# Patient Record
Sex: MALE | Race: ASIAN | Employment: FULL TIME | URBAN - METROPOLITAN AREA
[De-identification: names, ages, dates, MRNs, and addresses within clinical notes are randomized per-mention and may not be internally consistent; named-entity substitution may affect disease eponyms.]

---

## 2017-02-08 ENCOUNTER — OFFICE VISIT (OUTPATIENT)
Dept: FAMILY MEDICINE CLINIC | Age: 52
End: 2017-02-08

## 2017-02-08 VITALS
BODY MASS INDEX: 24.94 KG/M2 | HEIGHT: 66 IN | HEART RATE: 94 BPM | SYSTOLIC BLOOD PRESSURE: 132 MMHG | OXYGEN SATURATION: 97 % | TEMPERATURE: 98 F | DIASTOLIC BLOOD PRESSURE: 86 MMHG | WEIGHT: 155.2 LBS

## 2017-02-08 DIAGNOSIS — J06.9 VIRAL URI: Primary | ICD-10-CM

## 2017-02-08 PROCEDURE — 99213 OFFICE O/P EST LOW 20 MIN: CPT | Performed by: PHYSICIAN ASSISTANT

## 2017-02-08 ASSESSMENT — ENCOUNTER SYMPTOMS
EYE PAIN: 0
SORE THROAT: 1
WHEEZING: 0
DIARRHEA: 0
VOICE CHANGE: 0
CONSTIPATION: 0
EYE DISCHARGE: 0
SINUS PRESSURE: 0
BACK PAIN: 0
NAUSEA: 0
CHEST TIGHTNESS: 0
APNEA: 0
SHORTNESS OF BREATH: 0
COUGH: 1
RHINORRHEA: 0
VOMITING: 0
ABDOMINAL PAIN: 0
TROUBLE SWALLOWING: 0

## 2018-07-23 ENCOUNTER — OFFICE VISIT (OUTPATIENT)
Dept: FAMILY MEDICINE CLINIC | Age: 53
End: 2018-07-23

## 2018-07-23 VITALS
WEIGHT: 164 LBS | SYSTOLIC BLOOD PRESSURE: 140 MMHG | OXYGEN SATURATION: 95 % | DIASTOLIC BLOOD PRESSURE: 84 MMHG | HEART RATE: 87 BPM | RESPIRATION RATE: 16 BRPM | BODY MASS INDEX: 26.36 KG/M2 | HEIGHT: 66 IN

## 2018-07-23 DIAGNOSIS — Z00.00 WELL ADULT EXAM: Primary | ICD-10-CM

## 2018-07-23 DIAGNOSIS — R03.0 ELEVATED BP WITHOUT DIAGNOSIS OF HYPERTENSION: ICD-10-CM

## 2018-07-23 DIAGNOSIS — Z12.5 PROSTATE CANCER SCREENING: ICD-10-CM

## 2018-07-23 PROCEDURE — 99396 PREV VISIT EST AGE 40-64: CPT | Performed by: FAMILY MEDICINE

## 2018-07-23 PROCEDURE — 93000 ELECTROCARDIOGRAM COMPLETE: CPT | Performed by: FAMILY MEDICINE

## 2018-07-23 ASSESSMENT — PATIENT HEALTH QUESTIONNAIRE - PHQ9
SUM OF ALL RESPONSES TO PHQ9 QUESTIONS 1 & 2: 0
1. LITTLE INTEREST OR PLEASURE IN DOING THINGS: 0
SUM OF ALL RESPONSES TO PHQ QUESTIONS 1-9: 0
2. FEELING DOWN, DEPRESSED OR HOPELESS: 0

## 2018-07-23 NOTE — PROGRESS NOTES
Subjective:      Patient ID: Stvee Jaramillo is a 50 y.o. male. HPI   Here for HPI. No concerns now, but couple months ago, some pain/numbness for about 2-3 weeks, then went away. No trauma, no previous similar sxs. Used to have back pain few years ago, no leg sxs, and they resolved within couple weeks. Energy ok, and sleeping ok. Bloating/gas for years, and no problems to move bowels, but has to sit awhile to get complete relief. Plenty of fiber usually. Diet - relatively lowfat, lots rice, wants to reduce it, some wheat, and grains, few sweets, coffee/tea regular. Little juice, no pop. Exercise - not regularly, but some walking most days, about 30 minutes, and last year tracked, about 8K/day. Since last winter - mouth dries out quickly, seems to be breathing more from mouth than in the past.  Nose gets clogged some in the winter at times. Flu shot through work. [employer] [place] [employer], ok overall. The 10-year ASCVD risk score (Melissa Schmitz, et al., 2013) is: 7.4%    Values used to calculate the score:      Age: 50 years      Sex: Male      Is Non- : No      Diabetic: No      Tobacco smoker: No      Systolic Blood Pressure: 161 mmHg      Is BP treated: No      HDL Cholesterol: 34 mg/dL      Total Cholesterol: 228 mg/dL      Review of Systems   Constitutional: Negative for chills, fatigue and fever. HENT: Negative for ear pain and hearing loss. Eyes: Negative for pain and visual disturbance. Respiratory: Negative for cough and shortness of breath. Cardiovascular: Negative for chest pain and palpitations. Gastrointestinal: Positive for abdominal pain (occasionally with pain, mainly when he is bloated). Negative for diarrhea and vomiting. Genitourinary: Negative for difficulty urinating and dysuria. Musculoskeletal: Negative for arthralgias and gait problem. Neurological: Negative for dizziness, weakness and numbness.    Psychiatric/Behavioral: Negative for dysphoric

## 2018-07-24 ASSESSMENT — ENCOUNTER SYMPTOMS
EYE PAIN: 0
DIARRHEA: 0
VOMITING: 0
COUGH: 0
ABDOMINAL PAIN: 1
SHORTNESS OF BREATH: 0

## 2018-08-02 ENCOUNTER — NURSE ONLY (OUTPATIENT)
Dept: FAMILY MEDICINE CLINIC | Age: 53
End: 2018-08-02

## 2018-08-02 DIAGNOSIS — Z00.00 WELL ADULT EXAM: ICD-10-CM

## 2018-08-02 DIAGNOSIS — Z12.5 PROSTATE CANCER SCREENING: ICD-10-CM

## 2018-08-02 LAB
A/G RATIO: 2 (ref 1.1–2.2)
ALBUMIN SERPL-MCNC: 4.5 G/DL (ref 3.4–5)
ALP BLD-CCNC: 73 U/L (ref 40–129)
ALT SERPL-CCNC: 35 U/L (ref 10–40)
ANION GAP SERPL CALCULATED.3IONS-SCNC: 12 MMOL/L (ref 3–16)
AST SERPL-CCNC: 26 U/L (ref 15–37)
BASOPHILS ABSOLUTE: 0.1 K/UL (ref 0–0.2)
BASOPHILS RELATIVE PERCENT: 1.4 %
BILIRUB SERPL-MCNC: 0.5 MG/DL (ref 0–1)
BILIRUBIN, POC: 0
BLOOD URINE, POC: NORMAL
BUN BLDV-MCNC: 9 MG/DL (ref 7–20)
CALCIUM SERPL-MCNC: 9.4 MG/DL (ref 8.3–10.6)
CHLORIDE BLD-SCNC: 101 MMOL/L (ref 99–110)
CHOLESTEROL, TOTAL: 211 MG/DL (ref 0–199)
CLARITY, POC: CLEAR
CO2: 26 MMOL/L (ref 21–32)
COLOR, POC: NORMAL
CREAT SERPL-MCNC: 1 MG/DL (ref 0.9–1.3)
EOSINOPHILS ABSOLUTE: 0.1 K/UL (ref 0–0.6)
EOSINOPHILS RELATIVE PERCENT: 2.6 %
GFR AFRICAN AMERICAN: >60
GFR NON-AFRICAN AMERICAN: >60
GLOBULIN: 2.3 G/DL
GLUCOSE BLD-MCNC: 94 MG/DL (ref 70–99)
GLUCOSE URINE, POC: 0
HCT VFR BLD CALC: 48.3 % (ref 40.5–52.5)
HDLC SERPL-MCNC: 31 MG/DL (ref 40–60)
HEMOGLOBIN: 15.9 G/DL (ref 13.5–17.5)
KETONES, POC: 0
LDL CHOLESTEROL CALCULATED: 127 MG/DL
LEUKOCYTE EST, POC: 0
LYMPHOCYTES ABSOLUTE: 1.9 K/UL (ref 1–5.1)
LYMPHOCYTES RELATIVE PERCENT: 38.3 %
MCH RBC QN AUTO: 29.1 PG (ref 26–34)
MCHC RBC AUTO-ENTMCNC: 33 G/DL (ref 31–36)
MCV RBC AUTO: 88.2 FL (ref 80–100)
MONOCYTES ABSOLUTE: 0.3 K/UL (ref 0–1.3)
MONOCYTES RELATIVE PERCENT: 5.2 %
NEUTROPHILS ABSOLUTE: 2.6 K/UL (ref 1.7–7.7)
NEUTROPHILS RELATIVE PERCENT: 52.5 %
NITRITE, POC: 0
PDW BLD-RTO: 14.7 % (ref 12.4–15.4)
PH, POC: 6
PLATELET # BLD: 231 K/UL (ref 135–450)
PMV BLD AUTO: 8.3 FL (ref 5–10.5)
POTASSIUM SERPL-SCNC: 4.7 MMOL/L (ref 3.5–5.1)
PROSTATE SPECIFIC ANTIGEN: 0.46 NG/ML (ref 0–4)
PROTEIN, POC: NORMAL
RBC # BLD: 5.48 M/UL (ref 4.2–5.9)
SODIUM BLD-SCNC: 139 MMOL/L (ref 136–145)
SPECIFIC GRAVITY, POC: 1.02
TOTAL PROTEIN: 6.8 G/DL (ref 6.4–8.2)
TRIGL SERPL-MCNC: 265 MG/DL (ref 0–150)
TSH SERPL DL<=0.05 MIU/L-ACNC: 2.3 UIU/ML (ref 0.27–4.2)
UROBILINOGEN, POC: 0.2
VLDLC SERPL CALC-MCNC: 53 MG/DL
WBC # BLD: 4.9 K/UL (ref 4–11)

## 2018-08-02 PROCEDURE — 81002 URINALYSIS NONAUTO W/O SCOPE: CPT | Performed by: FAMILY MEDICINE

## 2018-08-02 PROCEDURE — 36415 COLL VENOUS BLD VENIPUNCTURE: CPT | Performed by: FAMILY MEDICINE

## 2018-08-07 PROBLEM — E78.2 MIXED HYPERLIPIDEMIA: Status: ACTIVE | Noted: 2018-08-07

## 2019-09-24 ENCOUNTER — OFFICE VISIT (OUTPATIENT)
Dept: FAMILY MEDICINE CLINIC | Age: 54
End: 2019-09-24
Payer: COMMERCIAL

## 2019-09-24 VITALS
SYSTOLIC BLOOD PRESSURE: 126 MMHG | WEIGHT: 163.8 LBS | BODY MASS INDEX: 26.33 KG/M2 | DIASTOLIC BLOOD PRESSURE: 80 MMHG | OXYGEN SATURATION: 98 % | HEART RATE: 69 BPM | HEIGHT: 66 IN | TEMPERATURE: 97.8 F

## 2019-09-24 DIAGNOSIS — L30.9 DERMATITIS: Primary | ICD-10-CM

## 2019-09-24 PROCEDURE — 99213 OFFICE O/P EST LOW 20 MIN: CPT | Performed by: FAMILY MEDICINE

## 2019-09-24 RX ORDER — TRIAMCINOLONE ACETONIDE 1 MG/G
CREAM TOPICAL
Qty: 45 G | Refills: 1 | Status: SHIPPED | OUTPATIENT
Start: 2019-09-24 | End: 2020-11-12

## 2019-09-24 ASSESSMENT — PATIENT HEALTH QUESTIONNAIRE - PHQ9
2. FEELING DOWN, DEPRESSED OR HOPELESS: 0
1. LITTLE INTEREST OR PLEASURE IN DOING THINGS: 0
SUM OF ALL RESPONSES TO PHQ QUESTIONS 1-9: 0
SUM OF ALL RESPONSES TO PHQ9 QUESTIONS 1 & 2: 0
SUM OF ALL RESPONSES TO PHQ QUESTIONS 1-9: 0

## 2019-09-25 ASSESSMENT — ENCOUNTER SYMPTOMS
ABDOMINAL PAIN: 0
SHORTNESS OF BREATH: 0

## 2019-09-25 NOTE — PATIENT INSTRUCTIONS
Suspect irritant or allergic dermatitis, with actual cause unknown. Start taking daily generic Allegra 180 mg every night, as well as intermittent use mid potency topical steroid as discussed, for maximum of 14 days in a row.     Call or return if no definite improvement over the next week or so -consider follow-up with allergist.

## 2020-11-12 ENCOUNTER — OFFICE VISIT (OUTPATIENT)
Dept: FAMILY MEDICINE CLINIC | Age: 55
End: 2020-11-12
Payer: COMMERCIAL

## 2020-11-12 VITALS
WEIGHT: 163.8 LBS | TEMPERATURE: 96.7 F | HEIGHT: 66 IN | HEART RATE: 72 BPM | BODY MASS INDEX: 26.33 KG/M2 | SYSTOLIC BLOOD PRESSURE: 155 MMHG | RESPIRATION RATE: 18 BRPM | OXYGEN SATURATION: 98 % | DIASTOLIC BLOOD PRESSURE: 95 MMHG

## 2020-11-12 LAB
A/G RATIO: 1.6 (ref 1.1–2.2)
ALBUMIN SERPL-MCNC: 4.4 G/DL (ref 3.4–5)
ALP BLD-CCNC: 87 U/L (ref 40–129)
ALT SERPL-CCNC: 25 U/L (ref 10–40)
ANION GAP SERPL CALCULATED.3IONS-SCNC: 11 MMOL/L (ref 3–16)
AST SERPL-CCNC: 20 U/L (ref 15–37)
BILIRUB SERPL-MCNC: 0.7 MG/DL (ref 0–1)
BUN BLDV-MCNC: 9 MG/DL (ref 7–20)
CALCIUM SERPL-MCNC: 9.4 MG/DL (ref 8.3–10.6)
CHLORIDE BLD-SCNC: 98 MMOL/L (ref 99–110)
CHOLESTEROL, TOTAL: 246 MG/DL (ref 0–199)
CO2: 26 MMOL/L (ref 21–32)
CREAT SERPL-MCNC: 0.9 MG/DL (ref 0.9–1.3)
GFR AFRICAN AMERICAN: >60
GFR NON-AFRICAN AMERICAN: >60
GLOBULIN: 2.8 G/DL
GLUCOSE BLD-MCNC: 87 MG/DL (ref 70–99)
HDLC SERPL-MCNC: 31 MG/DL (ref 40–60)
LDL CHOLESTEROL CALCULATED: ABNORMAL MG/DL
LDL CHOLESTEROL DIRECT: 166 MG/DL
POTASSIUM SERPL-SCNC: 4.8 MMOL/L (ref 3.5–5.1)
PROSTATE SPECIFIC ANTIGEN: 0.48 NG/ML (ref 0–4)
SODIUM BLD-SCNC: 135 MMOL/L (ref 136–145)
TOTAL PROTEIN: 7.2 G/DL (ref 6.4–8.2)
TRIGL SERPL-MCNC: 324 MG/DL (ref 0–150)
VLDLC SERPL CALC-MCNC: ABNORMAL MG/DL

## 2020-11-12 PROCEDURE — 90686 IIV4 VACC NO PRSV 0.5 ML IM: CPT | Performed by: FAMILY MEDICINE

## 2020-11-12 PROCEDURE — 99396 PREV VISIT EST AGE 40-64: CPT | Performed by: FAMILY MEDICINE

## 2020-11-12 PROCEDURE — 90471 IMMUNIZATION ADMIN: CPT | Performed by: FAMILY MEDICINE

## 2020-11-12 PROCEDURE — 36415 COLL VENOUS BLD VENIPUNCTURE: CPT | Performed by: FAMILY MEDICINE

## 2020-11-12 RX ORDER — LISINOPRIL AND HYDROCHLOROTHIAZIDE 20; 12.5 MG/1; MG/1
1 TABLET ORAL DAILY
Qty: 90 TABLET | Refills: 1 | Status: SHIPPED | OUTPATIENT
Start: 2020-11-12 | End: 2021-02-12 | Stop reason: SDUPTHER

## 2020-11-12 SDOH — SOCIAL STABILITY: SOCIAL NETWORK
DO YOU BELONG TO ANY CLUBS OR ORGANIZATIONS SUCH AS CHURCH GROUPS UNIONS, FRATERNAL OR ATHLETIC GROUPS, OR SCHOOL GROUPS?: NO

## 2020-11-12 SDOH — HEALTH STABILITY: PHYSICAL HEALTH: ON AVERAGE, HOW MANY DAYS PER WEEK DO YOU ENGAGE IN MODERATE TO STRENUOUS EXERCISE (LIKE A BRISK WALK)?: 2 DAYS

## 2020-11-12 SDOH — SOCIAL STABILITY: SOCIAL INSECURITY: WITHIN THE LAST YEAR, HAVE YOU BEEN AFRAID OF YOUR PARTNER OR EX-PARTNER?: NO

## 2020-11-12 SDOH — ECONOMIC STABILITY: TRANSPORTATION INSECURITY
IN THE PAST 12 MONTHS, HAS LACK OF TRANSPORTATION KEPT YOU FROM MEETINGS, WORK, OR FROM GETTING THINGS NEEDED FOR DAILY LIVING?: NO

## 2020-11-12 SDOH — SOCIAL STABILITY: SOCIAL INSECURITY
WITHIN THE LAST YEAR, HAVE YOU BEEN KICKED, HIT, SLAPPED, OR OTHERWISE PHYSICALLY HURT BY YOUR PARTNER OR EX-PARTNER?: NO

## 2020-11-12 SDOH — SOCIAL STABILITY: SOCIAL INSECURITY
WITHIN THE LAST YEAR, HAVE TO BEEN RAPED OR FORCED TO HAVE ANY KIND OF SEXUAL ACTIVITY BY YOUR PARTNER OR EX-PARTNER?: NO

## 2020-11-12 SDOH — ECONOMIC STABILITY: INCOME INSECURITY: HOW HARD IS IT FOR YOU TO PAY FOR THE VERY BASICS LIKE FOOD, HOUSING, MEDICAL CARE, AND HEATING?: NOT HARD AT ALL

## 2020-11-12 SDOH — HEALTH STABILITY: PHYSICAL HEALTH: ON AVERAGE, HOW MANY MINUTES DO YOU ENGAGE IN EXERCISE AT THIS LEVEL?: 40 MIN

## 2020-11-12 SDOH — SOCIAL STABILITY: SOCIAL NETWORK: HOW OFTEN DO YOU ATTENT MEETINGS OF THE CLUB OR ORGANIZATION YOU BELONG TO?: NEVER

## 2020-11-12 SDOH — SOCIAL STABILITY: SOCIAL NETWORK: IN A TYPICAL WEEK, HOW MANY TIMES DO YOU TALK ON THE PHONE WITH FAMILY, FRIENDS, OR NEIGHBORS?: TWICE A WEEK

## 2020-11-12 SDOH — SOCIAL STABILITY: SOCIAL NETWORK: HOW OFTEN DO YOU ATTEND CHURCH OR RELIGIOUS SERVICES?: NEVER

## 2020-11-12 SDOH — ECONOMIC STABILITY: FOOD INSECURITY: WITHIN THE PAST 12 MONTHS, THE FOOD YOU BOUGHT JUST DIDN'T LAST AND YOU DIDN'T HAVE MONEY TO GET MORE.: NEVER TRUE

## 2020-11-12 SDOH — SOCIAL STABILITY: SOCIAL INSECURITY: WITHIN THE LAST YEAR, HAVE YOU BEEN HUMILIATED OR EMOTIONALLY ABUSED IN OTHER WAYS BY YOUR PARTNER OR EX-PARTNER?: NO

## 2020-11-12 SDOH — SOCIAL STABILITY: SOCIAL NETWORK: ARE YOU MARRIED, WIDOWED, DIVORCED, SEPARATED, NEVER MARRIED, OR LIVING WITH A PARTNER?: MARRIED

## 2020-11-12 SDOH — HEALTH STABILITY: MENTAL HEALTH
STRESS IS WHEN SOMEONE FEELS TENSE, NERVOUS, ANXIOUS, OR CAN'T SLEEP AT NIGHT BECAUSE THEIR MIND IS TROUBLED. HOW STRESSED ARE YOU?: TO SOME EXTENT

## 2020-11-12 SDOH — SOCIAL STABILITY: SOCIAL NETWORK: HOW OFTEN DO YOU GET TOGETHER WITH FRIENDS OR RELATIVES?: TWICE A WEEK

## 2020-11-12 SDOH — ECONOMIC STABILITY: FOOD INSECURITY: WITHIN THE PAST 12 MONTHS, YOU WORRIED THAT YOUR FOOD WOULD RUN OUT BEFORE YOU GOT MONEY TO BUY MORE.: NEVER TRUE

## 2020-11-12 SDOH — ECONOMIC STABILITY: TRANSPORTATION INSECURITY
IN THE PAST 12 MONTHS, HAS THE LACK OF TRANSPORTATION KEPT YOU FROM MEDICAL APPOINTMENTS OR FROM GETTING MEDICATIONS?: NO

## 2020-11-12 ASSESSMENT — ANXIETY QUESTIONNAIRES
1. FEELING NERVOUS, ANXIOUS, OR ON EDGE: 1-SEVERAL DAYS
2. NOT BEING ABLE TO STOP OR CONTROL WORRYING: 0-NOT AT ALL
3. WORRYING TOO MUCH ABOUT DIFFERENT THINGS: 0-NOT AT ALL
5. BEING SO RESTLESS THAT IT IS HARD TO SIT STILL: 0-NOT AT ALL
GAD7 TOTAL SCORE: 2
4. TROUBLE RELAXING: 1-SEVERAL DAYS
7. FEELING AFRAID AS IF SOMETHING AWFUL MIGHT HAPPEN: 0-NOT AT ALL
6. BECOMING EASILY ANNOYED OR IRRITABLE: 0-NOT AT ALL

## 2020-11-12 ASSESSMENT — ENCOUNTER SYMPTOMS
DIARRHEA: 0
CONSTIPATION: 0
SHORTNESS OF BREATH: 0
COLOR CHANGE: 0
ABDOMINAL PAIN: 0
EYE DISCHARGE: 0
RHINORRHEA: 0
BACK PAIN: 0
EYE PAIN: 0
BLOOD IN STOOL: 0
SORE THROAT: 0
WHEEZING: 0

## 2020-11-12 ASSESSMENT — PATIENT HEALTH QUESTIONNAIRE - PHQ9
SUM OF ALL RESPONSES TO PHQ QUESTIONS 1-9: 0
SUM OF ALL RESPONSES TO PHQ QUESTIONS 1-9: 0
SUM OF ALL RESPONSES TO PHQ9 QUESTIONS 1 & 2: 0
SUM OF ALL RESPONSES TO PHQ QUESTIONS 1-9: 0
2. FEELING DOWN, DEPRESSED OR HOPELESS: 0
1. LITTLE INTEREST OR PLEASURE IN DOING THINGS: 0

## 2020-11-12 NOTE — PATIENT INSTRUCTIONS
-start lisinopril hctz combo pill in place of your lisinopril  Take 1 tablet daily with water    -low salt diet    -increase physical activity    -set up colonoscopy at your discretion/convenience    -see me back in 3 months    -keep log of your blood pressures each day    -bring your cuff with you to next visit    -okay for dental procedure      -Recommend 150 minutes of cardiovascular activity a week, or 10,000 to 15,000 steps a day, 2 days of weightbearing  -dietary wise, try to stick to your weight x 10 in calories a day  -avoid processed/refined carbohydrates (boxed/canned/frozen/fast)  -encourage healthy protein and fat, butter, avocado, egg      Patient Education        DASH Diet: Care Instructions  Your Care Instructions     The DASH diet is an eating plan that can help lower your blood pressure. DASH stands for Dietary Approaches to Stop Hypertension. Hypertension is high blood pressure. The DASH diet focuses on eating foods that are high in calcium, potassium, and magnesium. These nutrients can lower blood pressure. The foods that are highest in these nutrients are fruits, vegetables, low-fat dairy products, nuts, seeds, and legumes. But taking calcium, potassium, and magnesium supplements instead of eating foods that are high in those nutrients does not have the same effect. The DASH diet also includes whole grains, fish, and poultry. The DASH diet is one of several lifestyle changes your doctor may recommend to lower your high blood pressure. Your doctor may also want you to decrease the amount of sodium in your diet. Lowering sodium while following the DASH diet can lower blood pressure even further than just the DASH diet alone. Follow-up care is a key part of your treatment and safety. Be sure to make and go to all appointments, and call your doctor if you are having problems. It's also a good idea to know your test results and keep a list of the medicines you take.   How can you care for yourself at home? Following the DASH diet  · Eat 4 to 5 servings of fruit each day. A serving is 1 medium-sized piece of fruit, ½ cup chopped or canned fruit, 1/4 cup dried fruit, or 4 ounces (½ cup) of fruit juice. Choose fruit more often than fruit juice. · Eat 4 to 5 servings of vegetables each day. A serving is 1 cup of lettuce or raw leafy vegetables, ½ cup of chopped or cooked vegetables, or 4 ounces (½ cup) of vegetable juice. Choose vegetables more often than vegetable juice. · Get 2 to 3 servings of low-fat and fat-free dairy each day. A serving is 8 ounces of milk, 1 cup of yogurt, or 1 ½ ounces of cheese. · Eat 6 to 8 servings of grains each day. A serving is 1 slice of bread, 1 ounce of dry cereal, or ½ cup of cooked rice, pasta, or cooked cereal. Try to choose whole-grain products as much as possible. · Limit lean meat, poultry, and fish to 2 servings each day. A serving is 3 ounces, about the size of a deck of cards. · Eat 4 to 5 servings of nuts, seeds, and legumes (cooked dried beans, lentils, and split peas) each week. A serving is 1/3 cup of nuts, 2 tablespoons of seeds, or ½ cup of cooked beans or peas. · Limit fats and oils to 2 to 3 servings each day. A serving is 1 teaspoon of vegetable oil or 2 tablespoons of salad dressing. · Limit sweets and added sugars to 5 servings or less a week. A serving is 1 tablespoon jelly or jam, ½ cup sorbet, or 1 cup of lemonade. · Eat less than 2,300 milligrams (mg) of sodium a day. If you limit your sodium to 1,500 mg a day, you can lower your blood pressure even more. Tips for success  · Start small. Do not try to make dramatic changes to your diet all at once. You might feel that you are missing out on your favorite foods and then be more likely to not follow the plan. Make small changes, and stick with them. Once those changes become habit, add a few more changes. · Try some of the following:  ?  Make it a goal to eat a fruit or vegetable at every meal and at snacks. This will make it easy to get the recommended amount of fruits and vegetables each day. ? Try yogurt topped with fruit and nuts for a snack or healthy dessert. ? Add lettuce, tomato, cucumber, and onion to sandwiches. ? Combine a ready-made pizza crust with low-fat mozzarella cheese and lots of vegetable toppings. Try using tomatoes, squash, spinach, broccoli, carrots, cauliflower, and onions. ? Have a variety of cut-up vegetables with a low-fat dip as an appetizer instead of chips and dip. ? Sprinkle sunflower seeds or chopped almonds over salads. Or try adding chopped walnuts or almonds to cooked vegetables. ? Try some vegetarian meals using beans and peas. Add garbanzo or kidney beans to salads. Make burritos and tacos with mashed meneses beans or black beans. Where can you learn more? Go to https://[email or web address]. org and sign in to your [employer] account. Enter L913 in the [facility] box to learn more about \"DASH Diet: Care Instructions. \"     If you do not have an account, please click on the \"Sign Up Now\" link. Current as of: December 16, 2019               Content Version: 12.6  © 9200-3332 [employer]. Care instructions adapted under license by [facility] ([facility]). If you have questions about a medical condition or this instruction, always ask your healthcare professional. [place] [address] any warranty or liability for your use of this information. Patient Education        hydrochlorothiazide and lisinopril  Pronunciation:  LUIS stein and lye SIN oh pril  Brand:  Zestoretic  What is the most important information I should know about hydrochlorothiazide and lisinopril? Do not use if you are pregnant. If you become pregnant, stop taking this medicine and tell your doctor right away.    You should not use this medicine if you have ever had angioedema, if you are unable to urinate, or if you are allergic to sulfa drugs or to any ACE inhibitor. Do not take hydrochlorothiazide and lisinopril within 36 hours before or after taking medicine that contains sacubitril (such as Entresto). If you have diabetes, do not use hydrochlorothiazide and lisinopril together with any medication that contains aliskiren (a blood pressure medicine). What is hydrochlorothiazide and lisinopril? Hydrochlorothiazide is a thiazide diuretic (water pill). Lisinopril is in an ACE inhibitor (ACE stands for angiotensin converting enzyme). Hydrochlorothiazide and lisinopril is a combination medicine used to treat hypertension (high blood pressure). Lowering blood pressure may lower your risk of a stroke or heart attack. Hydrochlorothiazide and lisinopril may also be used for purposes not listed in this medication guide. What should I discuss with my healthcare provider before taking hydrochlorothiazide and lisinopril? You should not use this medicine if you are allergic to hydrochlorothiazide or lisinopril, or if:  · you have hereditary angioedema;  · you are unable to urinate;  · you recently took a heart medicine called sacubitril;  · you have an allergy to sulfa drugs; or  · you have ever had a severe allergic reaction to any ACE inhibitor (benazepril, captopril, enalapril, fosinopril, moexipril, perindopril, quinapril, ramipril, trandolapril). Do not take hydrochlorothiazide and lisinopril within 36 hours before or after taking medicine that contains sacubitril (such as Entresto). If you have diabetes, do not use hydrochlorothiazide and lisinopril together with any medication that contains aliskiren (a blood pressure medicine). You may also need to avoid taking hydrochlorothiazide and lisinopril with aliskiren if you have kidney disease.   Tell your doctor if you have ever had:  · kidney disease (or if you are on dialysis);  · cirrhosis or other liver disease;  · glaucoma;  · heart disease or congestive heart failure;  · asthma or allergies;  · gout;  · lupus;  · an allergy to sulfa drugs or penicillin; or  · if you are on a low-salt diet. Do not use if you are pregnant. Stop using the medicine and tell your doctor right away if you become pregnant. Lisinopril can cause injury or death to the unborn baby if you take the medicine during your second or third trimester. You should not breastfeed while using hydrochlorothiazide and lisinopril. Hydrochlorothiazide and lisinopril is not approved for use by anyone younger than 25years old. How should I take hydrochlorothiazide and lisinopril? Follow all directions on your prescription label and read all medication guides or instruction sheets. Your doctor may occasionally change your dose. Use the medicine exactly as directed. Call your doctor if you have ongoing vomiting or diarrhea, or if you are sweating more than usual. You can easily become dehydrated while taking this medicine. This can lead to very low blood pressure, electrolyte disorders, or kidney failure. Your blood pressure will need to be checked often, and you may need occasional blood tests. If you need surgery, tell your surgeon you currently use this medicine. Keep using this medicine as directed, even if you feel well. High blood pressure often has no symptoms. You may need to use blood pressure medication for the rest of your life. Store at room temperature away from moisture, heat, and light. What happens if I miss a dose? Take the medicine as soon as you can, but skip the missed dose if it is almost time for your next dose. Do not take two doses at one time. What happens if I overdose? Seek emergency medical attention or call the Poison Help line at 1-221.789.4960. What should I avoid while taking hydrochlorothiazide and lisinopril? Drinking alcohol can further lower your blood pressure and may increase certain side effects of this medicine.   Do not use potassium supplements or salt substitutes while you are taking this medicine, unless your doctor has told you to. Avoid getting up too fast from a sitting or lying position, or you may feel dizzy. Avoid becoming overheated or dehydrated during exercise, in hot weather, or by not drinking enough fluids. Follow your doctor's instructions about the type and amount of liquids you should drink. In some cases, drinking too much liquid can be as unsafe as not drinking enough. What are the possible side effects of hydrochlorothiazide and lisinopril? Get emergency medical help if you have signs of an allergic reaction: hives; severe stomach pain; difficulty breathing; swelling of your face, lips, tongue, or throat. Call your doctor at once if you have:  · a light-headed feeling, like you might pass out;  · eye pain, vision problems;  · little or no urination;  · weakness, drowsiness, or feeling restless;  · fever, chills, sore throat, mouth sores, trouble swallowing;  · jaundice (yellowing of the skin or eyes);  · high potassium --nausea, tingly feeling, chest pain, irregular heartbeats, loss of movement;  · low potassium --leg cramps, constipation, irregular heartbeats, fluttering in your chest, increased thirst or urination, numbness or tingling, muscle weakness or limp feeling; or  · low sodium --headache, confusion, slurred speech, severe weakness, vomiting, loss of coordination, feeling unsteady. Common side effects may include:  · cough;  · headache;  · dizziness; or  · tired feeling. This is not a complete list of side effects and others may occur. Call your doctor for medical advice about side effects. You may report side effects to FDA at 6-289-FDA-4512. What other drugs will affect hydrochlorothiazide and lisinopril?   Tell your doctor about all your other medicines, especially:  · any other blood pressure medication;  · lithium;  · everolimus, sirolimus, temsirolimus; or  · NSAIDs (nonsteroidal anti-inflammatory drugs) --aspirin, ibuprofen (Advil, Motrin), naproxen (Aleve), celecoxib, diclofenac, indomethacin, meloxicam, and others. This list is not complete. Other drugs may affect hydrochlorothiazide and lisinopril, including prescription and over-the-counter medicines, vitamins, and herbal products. Not all possible drug interactions are listed here. Where can I get more information? Your pharmacist can provide more information about hydrochlorothiazide and lisinopril. Remember, keep this and all other medicines out of the reach of children, never share your medicines with others, and use this medication only for the indication prescribed. Every effort has been made to ensure that the information provided by [facility] is accurate, up-to-date, and complete, but no guarantee is made to that effect. Drug information contained herein may be time sensitive. [facility] information has been compiled for use by healthcare practitioners and consumers in the [place] and therefore [facility] does not warrant that uses outside of the [place] are appropriate, unless specifically indicated otherwise. [facility]'s drug information does not endorse drugs, diagnose patients or recommend therapy. [facility] drug information is an informational resource designed to assist licensed healthcare practitioners in caring for their patients and/or to serve consumers viewing this service as a supplement to, and not a substitute for, the expertise, skill, knowledge and judgment of healthcare practitioners. The absence of a warning for a given drug or drug combination in no way should be construed to indicate that the drug or drug combination is safe, effective or appropriate for any given patient. [facility] does not assume any responsibility for any aspect of healthcare administered with the aid of information [facility] provides.  The information contained herein is not intended to cover all possible uses, directions, precautions, warnings, drug interactions, allergic reactions, or adverse effects. If you have questions about the drugs you are taking, check with your doctor, nurse or pharmacist.  Copyright 5803-0610 [address]: 15.01. Revision date: 1/6/2020. Care instructions adapted under license by [facility] ([facility]). If you have questions about a medical condition or this instruction, always ask your healthcare professional. [place] [address] any warranty or liability for your use of this information.

## 2020-11-12 NOTE — PROGRESS NOTES
SUBJECTIVE:  Chief Complaint   Patient presents with   [employer] Established New Doctor    Annual Exam    Hypertension    Flu Vaccine    Health Maintenance    Colon Cancer Screening     HPI   Nikita Wilson is a 50 y.o.male that presents today for establish care/annual exam and concerns for HTN/elevated BP:  -HTN/Elevated BP:  Checks at home, runs 163/110 mmHg  Was 180 mmHg when teeth were bothering him  Rx low dose medication Lisinopril 20 mg daily  Vitals:    11/12/20 0922 11/12/20 0959   BP: 138/88 (!) 155/95   Site: Right Upper Arm Right Upper Arm   Position: Sitting Sitting   Cuff Size: Medium Adult Medium Adult   Pulse: 70 72     BP Readings from Last 2 Encounters:   09/24/19 126/80   07/23/18 (!) 140/84     Past Medical History:   Diagnosis Date    HTN (hypertension)     Hyperlipidemia     Tooth infection      History reviewed. No pertinent surgical history. Family History   Problem Relation Age of Onset    Heart Disease Other         2 brothers--CABG     Current Outpatient Medications   Medication Sig Dispense Refill    lisinopril-hydroCHLOROthiazide (PRINZIDE;ZESTORETIC) 20-12.5 MG per tablet Take 1 tablet by mouth daily 90 tablet 1    Omega-3 Fatty Acids (FISH OIL) 1200 MG CAPS Take  by mouth 2 times daily. No current facility-administered medications for this visit.       No Known Allergies    Social History     Socioeconomic History    Marital status:      Spouse name: Eleanor Remy Number of children: 2    Years of education: 23    Highest education level: Master's degree (e.g., MA, MS, Petey, MEd, MSW, KOKI)   Occupational History    Occupation: [employer]   Social Needs    Financial resource strain: Not hard at all   [employer] insecurity     Worry: Never true     Inability: Never true   [employer] needs     Medical: No     Non-medical: No   Tobacco Use    Smoking status: Never Smoker    Smokeless tobacco: Never Used   Substance and Sexual Activity    Alcohol use: No    Drug use: No    Sexual activity: Yes     Partners: Female   Lifestyle    Physical activity     Days per week: 2 days     Minutes per session: 40 min    Stress: To some extent   Relationships    Social connections     Talks on phone: Twice a week     Gets together: Twice a week     Attends Pent[employer]ism service: Never     Active member of club or organization: No     Attends meetings of clubs or organizations: Never     Relationship status:     Intimate partner violence     Fear of current or ex partner: No     Emotionally abused: No     Physically abused: No     Forced sexual activity: No   Other Topics Concern    Not on file   Social History Narrative    -Lives in [place], was in [facility] before    -moved there in 2004, was in [facility] 4 or 5 years prior       Immunization History   Administered Date(s) Administered    Influenza, Quadv, IM, PF (6 mo and older Fluzone, Flulaval, Fluarix, and 3 yrs and older Afluria) 11/12/2020    Td, unspecified formulation 01/23/2006    Tdap (Boostrix, Adacel) 01/07/2016     Past medical, surgical, and social history reviewed and updated. Medications, immunizations, and allergies reviewed and updated     Review of Systems   Constitutional: Negative for activity change, appetite change, chills, fever and unexpected weight change. HENT: Positive for dental problem. Negative for congestion, rhinorrhea and sore throat. Eyes: Negative for pain, discharge and visual disturbance. Respiratory: Negative for shortness of breath and wheezing. Cardiovascular: Negative for chest pain and leg swelling. Gastrointestinal: Negative for abdominal pain, blood in stool, constipation and diarrhea. Endocrine: Negative for polyuria. Genitourinary: Negative for dysuria and flank pain. Musculoskeletal: Positive for arthralgias (pain in knees, hiking). Negative for back pain and neck pain. Skin: Negative for color change, rash (improved) and wound. to person, place, and time. Cranial Nerves: No cranial nerve deficit. Psychiatric:         Speech: Speech normal.         Thought Content: Thought content does not include homicidal or suicidal ideation. ASSESSMENT/PLAN:  Shannan Jiang is a 61-year-old male who presents today for his annual physical exam.  Happens to have high blood pressure at the dental office and has been prescribed lisinopril 20 mg recently by urgent care physician. Blood pressure still elevated today and at home. Patient will bring his blood pressure cuff to calibrate at next office visit. DASH diet, cardiovascular to [place], add HCTZ to her regimen. Ambulatory monitoring. Follow-up in 3 months. Diabetes and cholesterol screening today, referred for colonoscopy. Patient with some gaseous pain as well and we will readdress at next visit. History and physical otherwise unremarkable. Consider shingles shot going forward, flu shot today. 1. Annual physical exam  -Chart/records reviewed, history and physical performed, health maintenance addressed and updated, presenting problems addressed. 2. Benign essential HTN  -not at goal; add hctz to lisinopril 20 mg daily  -start lisinopril hctz combo pill in place of your lisinopril  Take 1 tablet daily with water  -low salt diet  -increase physical activity  - Comprehensive Metabolic Panel  - lisinopril-hydroCHLOROthiazide (PRINZIDE;ZESTORETIC) 20-12.5 MG per tablet; Take 1 tablet by mouth daily  Dispense: 90 tablet; Refill: 1    3.  Mixed hyperlipidemia  Lab Results   Component Value Date    CHOL 211 (H) 08/02/2018    CHOL 228 (H) 01/07/2016    CHOL 226 (H) 10/01/2011     Lab Results   Component Value Date    TRIG 265 (H) 08/02/2018    TRIG 282 (H) 01/07/2016    TRIG 250 (H) 10/01/2011     Lab Results   Component Value Date    HDL 31 (L) 08/02/2018    HDL 34 (L) 01/07/2016    HDL 35 (L) 10/01/2011     Lab Results   Component Value Date    LDLCALC 127 (H) 08/02/2018    [facility] 138 (H) 01/07/2016    LDLCALC 141 (H) 10/01/2011     Lab Results   Component Value Date    LABVLDL 53 08/02/2018    LABVLDL 56 01/07/2016    LABVLDL 50 10/01/2011     No results found for: CHOLHDLRATIO  The 10-year ASCVD risk score (Vitaliy Simpson, et al., 2013) is: 12.5%    Values used to calculate the score:      Age: 50 years      Sex: Male      Is Non- : No      Diabetic: No      Tobacco smoker: No      Systolic Blood Pressure: 455 mmHg      Is BP treated: Yes      HDL Cholesterol: 31 mg/dL      Total Cholesterol: 211 mg/dL    - Lipid Panel    4. Tooth infection  -continue abx, okay for dental procedure based on BP    5. Screening PSA (prostate specific antigen)  - PSA, Prostatic Specific Antigen    6. Diabetes mellitus screening  - Comprehensive Metabolic Panel  - Hemoglobin A1C    7. Colon cancer screening  -set up colonoscopy at your discretion/convenience  - Oralee Saint, MD, Gastroenterology, [facility]    8. Need for influenza vaccination  - INFLUENZA, QUADV, 3 YRS AND OLDER, IM PF, PREFILL SYR OR SDV, 0.5ML (AFLURIA QUADV, PF)    Reviewed treatment plan with patient. Patient verbalized understanding to treatment plan and questions were answered. Return in about 3 months (around 2/12/2021). Anila Burden.  Christie Monzon.      11/12/2020

## 2020-11-12 NOTE — PROGRESS NOTES
Vaccine Information Sheet, \"Influenza - Inactivated\"  given to Melvi Hernández, or parent/legal guardian of  Melvi Hernández and verbalized understanding. Patient responses:    Have you ever had a reaction to a flu vaccine? No  Do you have any current illness? No  Have you ever had Guillian Gray Syndrome? No  Do you have a serious allergy to any of the follow: Neomycin, Polymyxin, Thimerosal, eggs or egg products? No    Flu vaccine given per order. Please see immunization tab. Risks and benefits explained. Current VIS given.

## 2020-11-13 LAB
ESTIMATED AVERAGE GLUCOSE: 108.3 MG/DL
HBA1C MFR BLD: 5.4 %

## 2020-12-17 ENCOUNTER — TELEPHONE (OUTPATIENT)
Dept: FAMILY MEDICINE CLINIC | Age: 55
End: 2020-12-17

## 2020-12-17 NOTE — TELEPHONE ENCOUNTER
PROVIDER FEEDBACK LOOP CALLED 3X     Patient:Eamon Albarran  : 1965  Referring Provider: Jose Gonsalez. Referral Type:  Eval and Treat     Procedures: AMB REFERRAL TO GASTROENTEROLOGY [REF25]  Date Service Ordered 2020        We were unable to reach Amari Davis to schedule test ordered by your office after 3 call attempts. Please call your patient to explain significance of ordered test and direct patient to call Central Scheduling to re-schedule test at their earliest convenience.     Please complete one of the following actions from Quick Actions buttons:     Route to Provider:  Route message to ordering provider to seek next steps in care plan.     Telephone Encounter:  Telephone encounter will open. Call patient to explain significance of ordered test and direct patient to call Central Scheduling to schedule test then Document details of call.     Open Referral:  Review referral notes or details if needed.     Close Referral:  Referral will open. Document in Notes section of referral why the referral is being closed. Examples of referral closure:  Patient had test done outside of [facility] ([facility]) (list location), Patient refuses test, Patient no longer having symptoms, Unable to reach patient.   Only close the referral if you are sure the test will not proceed.     Thank you     PARI Gary.

## 2021-02-12 ENCOUNTER — OFFICE VISIT (OUTPATIENT)
Dept: PRIMARY CARE CLINIC | Age: 56
End: 2021-02-12
Payer: COMMERCIAL

## 2021-02-12 ENCOUNTER — HOSPITAL ENCOUNTER (OUTPATIENT)
Age: 56
Discharge: HOME | End: 2021-02-12
Payer: COMMERCIAL

## 2021-02-12 VITALS
WEIGHT: 166.6 LBS | HEART RATE: 68 BPM | DIASTOLIC BLOOD PRESSURE: 92 MMHG | HEIGHT: 65 IN | SYSTOLIC BLOOD PRESSURE: 148 MMHG | TEMPERATURE: 98.4 F | OXYGEN SATURATION: 97 % | BODY MASS INDEX: 27.76 KG/M2

## 2021-02-12 DIAGNOSIS — Z91.89 RISK OF MYOCARDIAL INFARCTION OR STROKE 7.5% OR GREATER IN NEXT 10 YEARS: ICD-10-CM

## 2021-02-12 DIAGNOSIS — I10 ESSENTIAL HYPERTENSION: Primary | ICD-10-CM

## 2021-02-12 DIAGNOSIS — I10 ESSENTIAL HYPERTENSION: ICD-10-CM

## 2021-02-12 DIAGNOSIS — Z12.11 COLON CANCER SCREENING: ICD-10-CM

## 2021-02-12 DIAGNOSIS — E78.2 MIXED HYPERLIPIDEMIA: ICD-10-CM

## 2021-02-12 LAB
ANION GAP SERPL CALCULATED.3IONS-SCNC: 9 MMOL/L (ref 3–16)
BUN BLDV-MCNC: 9 MG/DL (ref 7–20)
CALCIUM SERPL-MCNC: 9.8 MG/DL (ref 8.3–10.6)
CHLORIDE BLD-SCNC: 99 MMOL/L (ref 99–110)
CO2: 29 MMOL/L (ref 21–32)
CREAT SERPL-MCNC: 0.9 MG/DL (ref 0.9–1.3)
GFR AFRICAN AMERICAN: >60
GFR NON-AFRICAN AMERICAN: >60
GLUCOSE BLD-MCNC: 91 MG/DL (ref 70–99)
POTASSIUM SERPL-SCNC: 4.2 MMOL/L (ref 3.5–5.1)
SODIUM BLD-SCNC: 137 MMOL/L (ref 136–145)

## 2021-02-12 PROCEDURE — 99213 OFFICE O/P EST LOW 20 MIN: CPT | Performed by: FAMILY MEDICINE

## 2021-02-12 PROCEDURE — 80048 BASIC METABOLIC PNL TOTAL CA: CPT

## 2021-02-12 PROCEDURE — 36415 COLL VENOUS BLD VENIPUNCTURE: CPT

## 2021-02-12 RX ORDER — ATORVASTATIN CALCIUM 20 MG/1
20 TABLET, FILM COATED ORAL DAILY
Qty: 90 TABLET | Refills: 1 | Status: SHIPPED | OUTPATIENT
Start: 2021-02-12 | End: 2021-08-17 | Stop reason: SDUPTHER

## 2021-02-12 RX ORDER — LISINOPRIL AND HYDROCHLOROTHIAZIDE 20; 12.5 MG/1; MG/1
2 TABLET ORAL DAILY
Qty: 180 TABLET | Refills: 1 | Status: SHIPPED | OUTPATIENT
Start: 2021-02-12 | End: 2021-08-17 | Stop reason: SDUPTHER

## 2021-02-12 SDOH — HEALTH STABILITY: PHYSICAL HEALTH: ON AVERAGE, HOW MANY MINUTES DO YOU ENGAGE IN EXERCISE AT THIS LEVEL?: NOT ASKED

## 2021-02-12 ASSESSMENT — ENCOUNTER SYMPTOMS
SHORTNESS OF BREATH: 0
CONSTIPATION: 0
DIARRHEA: 0
ABDOMINAL PAIN: 0
BLOOD IN STOOL: 0

## 2021-02-12 ASSESSMENT — PATIENT HEALTH QUESTIONNAIRE - PHQ9
SUM OF ALL RESPONSES TO PHQ QUESTIONS 1-9: 0

## 2021-02-12 NOTE — PATIENT INSTRUCTIONS
Increase losartan- hctz to 2 pills a day    Start atorvastatin for cholesterol and to lower stroke/heart attack risk    Call to set up colonoscopy    -Recommend 150 minutes of cardiovascular activity a week, or 10,000 to 15,000 steps a day, 2 days of weightbearing  -dietary wise, try to stick to your weight x 10 in calories a day  -avoid processed/refined carbohydrates (boxed/canned/frozen/fast)  -encourage healthy protein and fat, butter, avocado, egg    Low salt diet    Avoid/limit alcohol    Patient Education        DASH Diet: Care Instructions  Your Care Instructions     The DASH diet is an eating plan that can help lower your blood pressure. DASH stands for Dietary Approaches to Stop Hypertension. Hypertension is high blood pressure. The DASH diet focuses on eating foods that are high in calcium, potassium, and magnesium. These nutrients can lower blood pressure. The foods that are highest in these nutrients are fruits, vegetables, low-fat dairy products, nuts, seeds, and legumes. But taking calcium, potassium, and magnesium supplements instead of eating foods that are high in those nutrients does not have the same effect. The DASH diet also includes whole grains, fish, and poultry. The DASH diet is one of several lifestyle changes your doctor may recommend to lower your high blood pressure. Your doctor may also want you to decrease the amount of sodium in your diet. Lowering sodium while following the DASH diet can lower blood pressure even further than just the DASH diet alone. Follow-up care is a key part of your treatment and safety. Be sure to make and go to all appointments, and call your doctor if you are having problems. It's also a good idea to know your test results and keep a list of the medicines you take. How can you care for yourself at home?   Following the DASH diet · Eat 4 to 5 servings of fruit each day. A serving is 1 medium-sized piece of fruit, ½ cup chopped or canned fruit, 1/4 cup dried fruit, or 4 ounces (½ cup) of fruit juice. Choose fruit more often than fruit juice. · Eat 4 to 5 servings of vegetables each day. A serving is 1 cup of lettuce or raw leafy vegetables, ½ cup of chopped or cooked vegetables, or 4 ounces (½ cup) of vegetable juice. Choose vegetables more often than vegetable juice. · Get 2 to 3 servings of low-fat and fat-free dairy each day. A serving is 8 ounces of milk, 1 cup of yogurt, or 1 ½ ounces of cheese. · Eat 6 to 8 servings of grains each day. A serving is 1 slice of bread, 1 ounce of dry cereal, or ½ cup of cooked rice, pasta, or cooked cereal. Try to choose whole-grain products as much as possible. · Limit lean meat, poultry, and fish to 2 servings each day. A serving is 3 ounces, about the size of a deck of cards. · Eat 4 to 5 servings of nuts, seeds, and legumes (cooked dried beans, lentils, and split peas) each week. A serving is 1/3 cup of nuts, 2 tablespoons of seeds, or ½ cup of cooked beans or peas. · Limit fats and oils to 2 to 3 servings each day. A serving is 1 teaspoon of vegetable oil or 2 tablespoons of salad dressing. · Limit sweets and added sugars to 5 servings or less a week. A serving is 1 tablespoon jelly or jam, ½ cup sorbet, or 1 cup of lemonade. · Eat less than 2,300 milligrams (mg) of sodium a day. If you limit your sodium to 1,500 mg a day, you can lower your blood pressure even more. Tips for success  · Start small. Do not try to make dramatic changes to your diet all at once. You might feel that you are missing out on your favorite foods and then be more likely to not follow the plan. Make small changes, and stick with them. Once those changes become habit, add a few more changes.   · Try some of the following: ? Make it a goal to eat a fruit or vegetable at every meal and at snacks. This will make it easy to get the recommended amount of fruits and vegetables each day. ? Try yogurt topped with fruit and nuts for a snack or healthy dessert. ? Add lettuce, tomato, cucumber, and onion to sandwiches. ? Combine a ready-made pizza crust with low-fat mozzarella cheese and lots of vegetable toppings. Try using tomatoes, squash, spinach, broccoli, carrots, cauliflower, and onions. ? Have a variety of cut-up vegetables with a low-fat dip as an appetizer instead of chips and dip. ? Sprinkle sunflower seeds or chopped almonds over salads. Or try adding chopped walnuts or almonds to cooked vegetables. ? Try some vegetarian meals using beans and peas. Add garbanzo or kidney beans to salads. Make burritos and tacos with mashed meneses beans or black beans. Where can you learn more? Go to https://[employer]. org and sign in to your [employer] account. Enter H171 in the I[employer] box to learn more about \"DASH Diet: Care Instructions. \"     If you do not have an account, please click on the \"Sign Up Now\" link. Current as of: December 16, 2019               Content Version: 12.6  © 5009-8939 [employer] Care instructions adapted under license by [facility] ([facility]). If you have questions about a medical condition or this instruction, always ask your healthcare professional. [place] [address] any warranty or liability for your use of this information. Patient Education        atorvastatin  Pronunciation:  a TOR va sta tin  Brand:  Lipitor  What is the most important information I should know about atorvastatin? You should not take atorvastatin if you are pregnant or breast-feeding, or if you have liver disease. Stop taking this medication and tell your doctor right away if you become pregnant. Tell your doctor about all your current medicines and any you start or stop using. Many drugs can interact, and some drugs should not be used together. Atorvastatin can cause the breakdown of muscle tissue, which can lead to kidney failure. Call your doctor right away if you have unexplained muscle pain, tenderness, or weakness especially if you also have fever, unusual tiredness, or dark urine. What is atorvastatin? Atorvastatin is used together with diet to lower blood levels of \"bad\" cholesterol (low-density lipoprotein, or LDL), to increase levels of \"good\" cholesterol (high-density lipoprotein, or HDL), and to lower triglycerides (a type of fat in the blood). Atorvastatin is used to treat high cholesterol, and to lower the risk of stroke, heart attack, or other heart complications in people with type 2 diabetes, coronary heart disease, or other risk factors. Atorvastatin is used in adults and children who are at least 8years old. Atorvastatin may also be used for purposes not listed in this medication guide. What should I discuss with my healthcare provider before taking atorvastatin? You should not use atorvastatin if you are allergic to it, or if you have:  · liver disease; or  · if you are pregnant or breast-feeding. This medicine can harm an unborn baby or cause birth defects. Do not use if you are pregnant. Stop taking atorvastatin and tell your doctor right away if you become pregnant Use effective birth control to prevent pregnancy while you are taking this medicine. Do not  breast-feed while you are taking atorvastatin. Tell your doctor if you have ever had:  · liver problems;  · muscle pain or weakness;  · kidney disease;  · diabetes;  · a thyroid disorder; or  · if you drink more than 2 alcoholic beverages daily. Atorvastatin can cause the breakdown of muscle tissue, which can lead to kidney failure. This happens more often in women, in older adults, or people who have kidney disease or poorly controlled hypothyroidism (underactive thyroid). Atorvastatin is not approved for use by anyone younger than 8years old. How should I take atorvastatin? Follow all directions on your prescription label and read all medication guides or instruction sheets. Your doctor may occasionally change your dose. Use the medicine exactly as directed. Take the medicine at the same time each day, with or without food. Do not break an atorvastatin tablet before taking it. You may need to stop using atorvastatin for a short time if you have:  · uncontrolled seizures;  · an electrolyte imbalance (such as high or low potassium levels in your blood);  · severely low blood pressure;  · a severe infection or illness; or  · surgery or a medical emergency. It may take up to 2 weeks before your cholesterol levels improve, and you may need frequent blood tests. Even if you have no symptoms, tests can help your doctor determine if this medicine is effective. Atorvastatin is only part of a complete treatment program that may also include diet, exercise, and weight control. Follow your doctor's instructions very closely. Store at room temperature away from moisture, heat, and light. What happens if I miss a dose? Use the medicine as soon as you can, but skip the missed dose if you are more than 12 hours late for the dose. Do not use two doses at one time. What happens if I overdose? Seek emergency medical attention or call the Poison Help line at 1-481.288.6361. What should I avoid while taking atorvastatin? Avoid eating foods high in fat or cholesterol, or atorvastatin will not be as effective. Avoid drinking alcohol. It can raise triglyceride levels and may increase your risk of liver damage. Grapefruit may interact with atorvastatin and lead to unwanted side effects. Avoid drinking more than 1 liter of grapefruit juice while taking atorvastatin. What are the possible side effects of atorvastatin? Get emergency medical help if you have signs of an allergic reaction: hives; difficulty breathing; swelling of your face, lips, tongue, or throat. In rare cases, atorvastatin can cause a condition that results in the breakdown of skeletal muscle tissue, leading to kidney failure. Call your doctor right away if you have unexplained muscle pain, tenderness, or weakness especially if you also have fever, unusual tiredness, and dark colored urine. Also call your doctor at once if you have:  · pain or burning when you urinate;  · liver problems --upper stomach pain, weakness, tired feeling, loss of appetite, dark urine, jaundice (yellowing of the skin or eyes); or  · kidney problems --little or no urinating, swelling in your feet or ankles, feeling tired or short of breath. Common side effects may include:  · joint pain;  · stuffy nose, sore throat;  · diarrhea; or  · pain in your arms or legs. This is not a complete list of side effects and others may occur. Call your doctor for medical advice about side effects. You may report side effects to FDA at 1-284-FDA-6686. What other drugs will affect atorvastatin? Certain other drugs can increase your risk of serious muscle problems, and it is very important that your doctor knows if you are using any of them. Tell your doctor about all your current medicines and any you start or stop using, especially:  · antibiotic or antifungal medicine;  · birth control pills;  · other cholesterol-lowering medication;  · heart medication; or  · medicine to treat HIV or AIDS. This list is not complete. Other drugs may affect atorvastatin, including prescription and over-the-counter medicines, vitamins, and herbal products. Not all possible drug interactions are listed here. Where can I get more information? Your pharmacist can provide more information about atorvastatin. Remember, keep this and all other medicines out of the reach of children, never share your medicines with others, and use this medication only for the indication prescribed. Every effort has been made to ensure that the information provided by [address] is accurate, up-to-date, and complete, but no guarantee is made to that effect. Drug information contained herein may be time sensitive. [facility] information has been compiled for use by healthcare practitioners and consumers in the [facility] and therefore [facility] does not warrant that uses outside of the [facility] are appropriate, unless specifically indicated otherwise. [facility]'s drug information does not endorse drugs, diagnose patients or recommend therapy. [facility]'s drug information is an informational resource designed to assist licensed healthcare practitioners in caring for their patients and/or to serve consumers viewing this service as a supplement to, and not a substitute for, the expertise, skill, knowledge and judgment of healthcare practitioners. The absence of a warning for a given drug or drug combination in no way should be construed to indicate that the drug or drug combination is safe, effective or appropriate for any given patient. [facility] does not assume any responsibility for any aspect of healthcare administered with the aid of information [facility] provides. The information contained herein is not intended to cover all possible uses, directions, precautions, warnings, drug interactions, allergic reactions, or adverse effects. If you have questions about the drugs you are taking, check with your doctor, nurse or pharmacist.  Copyright 6157-9674 [address]: .02. Revision date: 9/27/2019.

## 2021-02-12 NOTE — PROGRESS NOTES
Chief Complaint   Patient presents with    3 Month Follow-Up    Hypertension    Hyperlipidemia     ascvd risk score 17.3%    Colon Cancer Screening     HPI:  Edmundo Power is a 51 y/o male here today for blood pressure follow up, ASCVD risk score >10%, and for reminder about colon cancer screening:    HTN:  Still high at home on Omiron cuff, brought into office today and higher than our automated and manual cuffs  140-150/80's/90's  No medication side effects  Denies headaches or chest pain    BP Readings from Last 3 Encounters:   02/12/21 (!) 148/92   11/12/20 (!) 155/95   09/24/19 126/80     Vitals:    02/12/21 0937 02/12/21 0953 02/12/21 0954 02/12/21 1009   BP: 138/86 (!) 161/105 (!) 169/108 (!) 148/92   Site:  Left Lower Arm  Left Upper Arm   Position:  Sitting  Sitting   Cuff Size:  Medium Adult  Medium Adult   Pulse: 76 73  68   Temp: 98.4 °F (36.9 °C)      TempSrc: Temporal      SpO2: 97%      Weight: 166 lb 9.6 oz (75.6 kg)      Height: 5' 5\" (1.651 m)        Hyperlipidemia:  The 10-year ASCVD risk score (Brittani Barcenas, et al., 2013) is: 17.3%    Lab Results   Component Value Date    CHOL 246 (H) 11/12/2020    CHOL 211 (H) 08/02/2018    CHOL 228 (H) 01/07/2016     Lab Results   Component Value Date    TRIG 324 (H) 11/12/2020    TRIG 265 (H) 08/02/2018    TRIG 282 (H) 01/07/2016     Lab Results   Component Value Date    HDL 31 (L) 11/12/2020    HDL 31 (L) 08/02/2018    HDL 34 (L) 01/07/2016     Lab Results   Component Value Date    LDLCALC see below 11/12/2020    LDLCALC 127 (H) 08/02/2018    LDLCALC 138 (H) 01/07/2016     Lab Results   Component Value Date    LABVLDL see below 11/12/2020    LABVLDL 53 08/02/2018    LABVLDL 56 01/07/2016     No results found for: CHOLHDLRATIO      Colon cancer screening:  Has not yet set up; will print out referal again today    Declines shingles vaccine, hepatitis c and hiv screening      Past Medical History:   Diagnosis Date    HTN (hypertension)  Hyperlipidemia     Risk of myocardial infarction or stroke 7.5% or greater in next 10 years 2/12/2021    Tooth infection      History reviewed. No pertinent surgical history. Current Outpatient Medications   Medication Sig Dispense Refill    lisinopril-hydroCHLOROthiazide (PRINZIDE;ZESTORETIC) 20-12.5 MG per tablet Take 2 tablets by mouth daily 180 tablet 1    atorvastatin (LIPITOR) 20 MG tablet Take 1 tablet by mouth daily 90 tablet 1    Omega-3 Fatty Acids (FISH OIL) 1200 MG CAPS Take  by mouth 2 times daily. No current facility-administered medications for this visit. No Known Allergies    Family History   Problem Relation Age of Onset    Heart Disease Other         2 brothers--CABG     Social History     Socioeconomic History    Marital status:      Spouse name: Liz Araiza    Number of children: 2    Years of education: 23    Highest education level: Master's degree (e.g., MA, MS, Petey, MEd, MSW, KOKI)   Occupational History    Occupation: [employer]   Social Needs    Financial resource strain: Not hard at all   [place] insecurity     Worry: Never true     Inability: Never true    Transportation needs     Medical: No     Non-medical: No   Tobacco Use    Smoking status: Never Smoker    Smokeless tobacco: Never Used   Substance and Sexual Activity    Alcohol use: No    Drug use: No    Sexual activity: Yes     Partners: Female   Lifestyle    Physical activity     Days per week: 0 days     Minutes per session: Not on file    Stress: To some extent   Relationships    Social connections     Talks on phone: Twice a week     Gets together: Twice a week     Attends Congregational service: Never     Active member of club or organization: No     Attends meetings of clubs or organizations: Never     Relationship status:     Intimate partner violence     Fear of current or ex partner: No     Emotionally abused: No     Physically abused:  No Forced sexual activity: No   Other Topics Concern    Not on file   Social History Narrative    -Lives in [place], was in [facility] before    -moved there in 2004, was in [facility] 4 or 5 years prior         Review of Systems   Constitutional: Negative for chills, fever and unexpected weight change. HENT: Negative for dental problem. Eyes: Negative for visual disturbance. Respiratory: Negative for shortness of breath. Cardiovascular: Negative for chest pain. Gastrointestinal: Negative for abdominal pain, blood in stool, constipation and diarrhea. Endocrine: Negative for polyuria. Genitourinary: Negative for dysuria and hematuria. Musculoskeletal: Positive for arthralgias (knee pain when hikes). Negative for joint swelling. Skin: Negative for rash. Allergic/Immunologic: Negative for environmental allergies and food allergies. Neurological: Negative for weakness, numbness and headaches. Psychiatric/Behavioral: Negative for dysphoric mood and sleep disturbance. The patient is not nervous/anxious. Vitals:    02/12/21 0937 02/12/21 0953 02/12/21 0954 02/12/21 1009   BP: 138/86 (!) 161/105 (!) 169/108 (!) 148/92   Site:  Left Lower Arm  Left Upper Arm   Position:  Sitting  Sitting   Cuff Size:  Medium Adult  Medium Adult   Pulse: 76 73  68   Temp: 98.4 °F (36.9 °C)      TempSrc: Temporal      SpO2: 97%      Weight: 166 lb 9.6 oz (75.6 kg)      Height: 5' 5\" (1.651 m)        Physical Exam  Constitutional:       Appearance: Normal appearance. He is not ill-appearing. HENT:      Head: Normocephalic and atraumatic. Eyes:      Extraocular Movements: Extraocular movements intact. Cardiovascular:      Rate and Rhythm: Normal rate and regular rhythm. Heart sounds: Normal heart sounds. No murmur. No friction rub. Pulmonary:      Effort: Pulmonary effort is normal.      Breath sounds: Normal breath sounds. No decreased breath sounds, wheezing, rhonchi or rales.    Neurological: General: No focal deficit present. Mental Status: He is alert and oriented to person, place, and time. Mental status is at baseline. Psychiatric:         Mood and Affect: Mood normal.         Behavior: Behavior normal.         Thought Content: Thought content normal.         Assessment and Plan:  Ashvin Healy is a 56-year-old male who presents for 3-month follow-up of uncontrolled blood pressure. Was started on lisinopril HCTZ 2012.5 milligrams daily at last office visit approximately 3 months ago. Blood pressure still greater than 140/90 in the ambulatory setting and here today in the office. Will increase patient dose to 40-25 mg daily. Encourage DASH diet, increase cardiovascular activity, recommend low refined carbohydrate diet. Patient with ASCVD risk of 17.3% and explained benefit of statin medication to help prevent stroke and heart attack. Patient agreeable to try mild to moderate dose statin. Patient was referred for colonoscopy last office visit but has not made a scheduled appointment, and I printed out his referral again and encouraged him to do so. Continue blood pressure log at home and report in the next 6 weeks through [address] Follow-up in 3 months. Monitor kidney and electrolytes today through blood work. Patient questions answered and verbalized understanding treatment plan and agreeable to plan. 1. Essential hypertension  - lisinopril-hydroCHLOROthiazide (PRINZIDE;ZESTORETIC) 20-12.5 MG per tablet; Take 2 tablets by mouth daily  Dispense: 180 tablet; Refill: 1  - Basic Metabolic Panel; Future    2. Mixed hyperlipidemia  3. Risk of myocardial infarction or stroke 7.5% or greater in next 10 years  ASCVD risk 17.3%  - atorvastatin (LIPITOR) 20 MG tablet; Take 1 tablet by mouth daily  Dispense: 90 tablet; Refill: 1    4.  Colon cancer screening  -given referal again today Return in about 3 months (around 5/12/2021) for blood pressure check, blood work, get colonoscopy. Tayo Lynch.  Prasanth Franco.  2/12/2021

## 2021-08-17 ENCOUNTER — OFFICE VISIT (OUTPATIENT)
Dept: PRIMARY CARE CLINIC | Age: 56
End: 2021-08-17
Payer: COMMERCIAL

## 2021-08-17 VITALS
TEMPERATURE: 98.2 F | HEART RATE: 70 BPM | SYSTOLIC BLOOD PRESSURE: 130 MMHG | WEIGHT: 162.2 LBS | BODY MASS INDEX: 26.99 KG/M2 | OXYGEN SATURATION: 97 % | DIASTOLIC BLOOD PRESSURE: 86 MMHG

## 2021-08-17 DIAGNOSIS — Z23 NEED FOR SHINGLES VACCINE: ICD-10-CM

## 2021-08-17 DIAGNOSIS — E66.3 OVERWEIGHT (BMI 25.0-29.9): ICD-10-CM

## 2021-08-17 DIAGNOSIS — Z91.89 RISK OF MYOCARDIAL INFARCTION OR STROKE 7.5% OR GREATER IN NEXT 10 YEARS: ICD-10-CM

## 2021-08-17 DIAGNOSIS — E78.2 MIXED HYPERLIPIDEMIA: ICD-10-CM

## 2021-08-17 DIAGNOSIS — I10 ESSENTIAL HYPERTENSION: Primary | ICD-10-CM

## 2021-08-17 PROCEDURE — 99214 OFFICE O/P EST MOD 30 MIN: CPT

## 2021-08-17 PROCEDURE — 90471 IMMUNIZATION ADMIN: CPT | Performed by: FAMILY MEDICINE

## 2021-08-17 PROCEDURE — 90750 HZV VACC RECOMBINANT IM: CPT | Performed by: FAMILY MEDICINE

## 2021-08-17 RX ORDER — ATORVASTATIN CALCIUM 20 MG/1
20 TABLET, FILM COATED ORAL DAILY
Qty: 90 TABLET | Refills: 1 | Status: SHIPPED | OUTPATIENT
Start: 2021-08-17 | End: 2022-02-03 | Stop reason: SDUPTHER

## 2021-08-17 RX ORDER — LISINOPRIL AND HYDROCHLOROTHIAZIDE 20; 12.5 MG/1; MG/1
2 TABLET ORAL DAILY
Qty: 180 TABLET | Refills: 1 | Status: SHIPPED | OUTPATIENT
Start: 2021-08-17 | End: 2022-02-03 | Stop reason: SDUPTHER

## 2021-08-17 ASSESSMENT — ENCOUNTER SYMPTOMS
SINUS PAIN: 0
EYE ITCHING: 0
BACK PAIN: 0
ABDOMINAL DISTENTION: 0
BLOOD IN STOOL: 0
APNEA: 0
COLOR CHANGE: 0
ANAL BLEEDING: 0
EYE PAIN: 0
ABDOMINAL PAIN: 0
COUGH: 0
EYE DISCHARGE: 0
CHEST TIGHTNESS: 0
FACIAL SWELLING: 0

## 2021-08-17 NOTE — PROGRESS NOTES
in couple month before the end of this year. Patient Active Problem List   Diagnosis    Mixed hyperlipidemia    HTN (hypertension)    Risk of myocardial infarction or stroke 7.5% or greater in next 10 years         Past Medical History:    Past Medical History:   Diagnosis Date    HTN (hypertension)     Hyperlipidemia     Risk of myocardial infarction or stroke 7.5% or greater in next 10 years 2/12/2021    Tooth infection        Past Surgical History:  No past surgical history on file. Home Meds:  Prior to Visit Medications    Medication Sig Taking? Authorizing Provider   lisinopril-hydroCHLOROthiazide (PRINZIDE;ZESTORETIC) 20-12.5 MG per tablet Take 2 tablets by mouth daily Take one tablet in the morning, one tablet at night Yes [facility]   atorvastatin (LIPITOR) 20 MG tablet Take 1 tablet by mouth daily Yes [place], [place]   Omega-3 Fatty Acids (FISH OIL) 1200 MG CAPS Take  by mouth 2 times daily. Yes Historical Provider, MD       Allergies:    Patient has no known allergies.     Family History:       Problem Relation Age of Onset    Heart Disease Other         2 brothers--CABG         Health Maintenance Completed:  Health Maintenance   Topic Date Due    Hepatitis C screen  Never done    COVID-19 Vaccine (1) Never done    HIV screen  Never done    Colon cancer screen colonoscopy  Never done    Shingles Vaccine (1 of 2) Never done    Flu vaccine (1) 09/01/2021    Lipid screen  11/12/2021    Potassium monitoring  02/12/2022    Creatinine monitoring  02/12/2022    DTaP/Tdap/Td vaccine (2 - Td or Tdap) 01/07/2026    Hepatitis A vaccine  Aged Out    Hepatitis B vaccine  Aged Out    Hib vaccine  Aged Out    Meningococcal (ACWY) vaccine  Aged Out    Pneumococcal 0-64 years Vaccine  Aged [employer] History   Administered Date(s) Administered    Influenza, Quadv, IM, PF (6 mo and older Fluzone, Flulaval, Fluarix, and 3 yrs and older Afluria) 11/12/2020    Td, unspecified formulation 01/23/2006    Tdap (Boostrix, Adacel) 01/07/2016         Review of Systems:  Review of Systems   Constitutional: Negative for activity change, appetite change, chills and fatigue. HENT: Negative for congestion, ear discharge, ear pain, facial swelling and sinus pain. Eyes: Negative for pain, discharge and itching. Respiratory: Negative for apnea, cough and chest tightness. Cardiovascular: Negative for chest pain, palpitations and leg swelling. Gastrointestinal: Negative for abdominal distention, abdominal pain, anal bleeding and blood in stool. Endocrine: Negative for cold intolerance, heat intolerance and polydipsia. Genitourinary: Negative for difficulty urinating, discharge and penile pain. Musculoskeletal: Negative for arthralgias, back pain and gait problem. Skin: Negative for color change, rash and wound. Neurological: Negative for dizziness, syncope, weakness, light-headedness and headaches. Physical Exam:   Vitals:    08/17/21 0738   BP: 130/86   Pulse: 70   Temp: 98.2 °F (36.8 °C)   TempSrc: Temporal   SpO2: 97%   Weight: 162 lb 3.2 oz (73.6 kg)     Body mass index is 26.99 kg/m². Wt Readings from Last 3 Encounters:   08/17/21 162 lb 3.2 oz (73.6 kg)   02/12/21 166 lb 9.6 oz (75.6 kg)   11/12/20 163 lb 12.8 oz (74.3 kg)       BP Readings from Last 3 Encounters:   08/17/21 130/86   02/12/21 (!) 148/92   11/12/20 (!) 155/95       Physical Exam  Constitutional:       General: He is not in acute distress. HENT:      Head: Normocephalic. Neck:      Vascular: No carotid bruit. Cardiovascular:      Rate and Rhythm: Normal rate. Pulses: Normal pulses. Heart sounds: No murmur heard. No friction rub. No gallop. Pulmonary:      Effort: Pulmonary effort is normal. No respiratory distress. Breath sounds: No wheezing or rales. Abdominal:      General: Abdomen is flat. Musculoskeletal:         General: Normal range of motion. Cervical back: Normal range of motion. No rigidity or tenderness. Lymphadenopathy:      Cervical: No cervical adenopathy. Neurological:      General: No focal deficit present. Mental Status: He is alert. Lab Review:   No visits with results within 6 Month(s) from this visit. Latest known visit with results is:   Hospital Outpatient Visit on 02/12/2021   Component Date Value    Sodium 02/12/2021 137     Potassium 02/12/2021 4.2     Chloride 02/12/2021 99     CO2 02/12/2021 29     Anion Gap 02/12/2021 9     Glucose 02/12/2021 91     BUN 02/12/2021 9     CREATININE 02/12/2021 0.9     GFR Non- 02/12/2021 >60     GFR  02/12/2021 >60     Calcium 02/12/2021 9.8           Assessment/Plan:  Philrene Edmund, 49years old [place] [facility] male, non-smoker with past medical history of hypertension and hyperlipidemia presents today to the office to follow up on his blood pressure. 1. Essential hypertension  - poorly controlled, not at goal   - Continue lisinopril/hydrochlorothiazide 40-25 mg daily  -Recommended patient to take 1 tablet in the morning, 1 tablet at night  -Recommended DASH diet  - lisinopril-hydroCHLOROthiazide (PRINZIDE;ZESTORETIC) 20-12.5 MG per tablet;   -If blood pressure is still not at goal on follow-up, consider starting amlodipine 5 mg per OLVIN guidelines. -Recommended microalbumin creatinine ratio, BMP lab work-up for today-patient refused as he would like to postpone it for his annual physical exam in couple months  -Consider microalbumin creatinine ratio labs, BMP for next visit    2.  Mixed hyperlipidemia / Risk of myocardial infarction or stroke 7.5% or greater in next 10 years  - poorly controlled, not at goal  - Discussed risk of myocardial infarction or stroke  - Recommended patient lipid panel, patient refused and would like to postpone it for next visit  -Recommended patient to maintain healthy lifestyle, decrease refined carbs, processed foods and fast food  -Recommended patient 160 minutes of aerobic exercises moderate intensity per week  - Continue atorvastatin (LIPITOR) 20 MG tablet;   -Consider lipid panel for next visit  -Consider increasing atorvastatin to 40 mg daily if lipid levels haven't decreased by 50%  - return to clinic in 2-3 months for annual physical exam    4. Need for shingles vaccine  - patient agreed  - Zoster recombinant [facility])    5. Overweight (BMI 25.0-29.9)  - recommended patient healthy lifestyle  - Recommended decrease refined carbs, processed foods and fast food  -Recommended patient 160 minutes of aerobic exercises moderate intensity per week    Health Maintenance Due:  Health Maintenance Due   Topic Date Due    Hepatitis C screen  Never done    HIV screen  Never done    Colon cancer screen colonoscopy  Never done          Health Maintenance: (USPSTF Recommendations)  CRC/Colonoscopy Screening: (adults 45-49 (B), 50-75 (A)) - postponed for next visit  HIV Screen: (16-65 yr old, and all pregnant patients (A)): Patient postponed blood work-up for next visit  Hep C Screen: (18-79 yr old (B)): Patient postponed blood work-up for next visit  Immunizations: Patient stated that he has already taken COVID-19 vaccine in April 2021      RTC:  - Return in about 2 months (around 10/17/2021) before end of this year to follow up on hypertension, hyperlipidemia, weight management.  -On follow-up consider lipid panel, microalbumin creatinine ratio, BMP, hep C screen, HIV screen and other required labs. -Schedule colonoscopy for colon cancer screening. EMR Dragon/transcription disclaimer:  Much of this encounter note is electronic transcription/translation of spoken language to printed texts. The electronic translation of spoken language may be erroneous, or at times, nonsensical words or phrases may be inadvertently transcribed.   Although I have reviewed the note for such errors, some may still exist.     BJ/ Lianne 9,

## 2021-08-17 NOTE — PATIENT INSTRUCTIONS
- Refilled medications  - schedule an appointment for colonoscopy before you return to office  - Maintain healthy lifestyle DASH diet  - Decrease intake of refined carbs, processed food and fast food. - Recommended 160 minutes per week of aerobic exercises with moderate intensity.  - return for annual physical in 2-3 months before end of this year    Reach out if you have any questions  Thank you    Patient Education        High Cholesterol: Care Instructions  Overview     Cholesterol is a type of fat in your blood. It is needed for many body functions, such as making new cells. Cholesterol is made by your body. It also comes from food you eat. High cholesterol means that you have too much of the fat in your blood. This raises your risk of a heart attack and stroke. LDL and HDL are part of your total cholesterol. LDL is the \"bad\" cholesterol. High LDL can raise your risk for coronary artery disease, heart attack, and stroke. HDL is the \"good\" cholesterol. It helps clear bad cholesterol from the body. High HDL is linked with a lower risk of coronary artery disease, heart attack, and stroke. Your cholesterol levels help your doctor find out your risk for having a heart attack or stroke. You and your doctor can talk about whether you need to lower your risk and what treatment is best for you. Treatment options include a heart-healthy lifestyle and medicine. Both options can help lower your cholesterol and your risk. The way you choose to lower your risk will depend on how high your risk is for heart attack and stroke. It will also depend on how you feel about taking medicines. Follow-up care is a key part of your treatment and safety. Be sure to make and go to all appointments, and call your doctor if you are having problems. It's also a good idea to know your test results and keep a list of the medicines you take. How can you care for yourself at home? · Eat heart-healthy foods.   ? Eat fruits, vegetables, whole grains, beans, and other high-fiber foods. ? Eat lean proteins, such as seafood, lean meats, beans, nuts, and soy products. ? Eat healthy fats, such as canola and olive oil. ? Choose foods that are low in saturated fat. ? Limit sodium and alcohol. ? Limit drinks and foods with added sugar. · Be physically active. Try to do moderate activity at least 2½ hours a week. Or try to do vigorous activity at least 1¼ hours a week. You may want to walk or try other activities, such as running, swimming, cycling, or playing tennis or team sports. · Stay at a healthy weight or lose weight by making the changes in eating and physical activity listed above. Losing just a small amount of weight, even 5 to 10 pounds, can help reduce your risk for having a heart attack or stroke. · Do not smoke. · Manage other health problems. These include diabetes and high blood pressure. If you think you may have a problem with alcohol or drug use, talk to your doctor. · If you take medicine, take it exactly as prescribed. Call your doctor if you think you are having a problem with your medicine. · Check with your doctor or pharmacist before you use any other medicines, including over-the-counter medicines. Make sure your doctor knows all of the medicines, vitamins, herbal products, and supplements you take. Taking some medicines together can cause problems. When should you call for help? Watch closely for changes in your health, and be sure to contact your doctor if:    · You need help making lifestyle changes.     · You have questions about your medicine. Where can you learn more? Go to [email or web address] and sign in to your [employer] account. Enter I381 in the Beyond the Box box to learn more about \"High Cholesterol: Care Instructions. \"     If you do not have an account, please click on the \"Sign Up Now\" link.   Current as of: August 31, 2020               Content Version: 12.9  © 2009-2204 Healthwise, Incorporated. Care instructions adapted under license by [facility] ([facility]). If you have questions about a medical condition or this instruction, always ask your healthcare professional. [address] any warranty or liability for your use of this information.

## 2021-08-20 NOTE — PROGRESS NOTES
Patient was seen and evaluated with the resident physician. I agree with plan of care as documented above.

## 2022-02-03 ENCOUNTER — TELEPHONE (OUTPATIENT)
Dept: PRIMARY CARE CLINIC | Age: 57
End: 2022-02-03

## 2022-02-03 DIAGNOSIS — I10 ESSENTIAL HYPERTENSION: ICD-10-CM

## 2022-02-03 DIAGNOSIS — Z12.11 COLON CANCER SCREENING: Primary | ICD-10-CM

## 2022-02-03 DIAGNOSIS — E78.2 MIXED HYPERLIPIDEMIA: ICD-10-CM

## 2022-02-03 DIAGNOSIS — Z91.89 RISK OF MYOCARDIAL INFARCTION OR STROKE 7.5% OR GREATER IN NEXT 10 YEARS: ICD-10-CM

## 2022-02-03 RX ORDER — ATORVASTATIN CALCIUM 20 MG/1
20 TABLET, FILM COATED ORAL DAILY
Qty: 90 TABLET | Refills: 1 | Status: SHIPPED | OUTPATIENT
Start: 2022-02-03 | End: 2022-04-25 | Stop reason: SDUPTHER

## 2022-02-03 RX ORDER — LISINOPRIL AND HYDROCHLOROTHIAZIDE 20; 12.5 MG/1; MG/1
2 TABLET ORAL DAILY
Qty: 180 TABLET | Refills: 1 | Status: SHIPPED | OUTPATIENT
Start: 2022-02-03 | End: 2022-04-25 | Stop reason: SDUPTHER

## 2022-02-03 NOTE — TELEPHONE ENCOUNTER
----- Message from Jean-Claude Hebert sent at 2/3/2022  3:16 PM EST -----  Subject: Message to Provider    QUESTIONS  Information for Provider? patient want to know where he need to go and get   his colonoscopy done at  ---------------------------------------------------------------------------  --------------  [address]  What is the best way for the office to contact you? OK to leave message on   voicemail  Preferred Call Back Phone Number? [phone]  ---------------------------------------------------------------------------  --------------  SCRIPT ANSWERS  Relationship to Patient?  Self

## 2022-02-03 NOTE — TELEPHONE ENCOUNTER
----- Message from Charmaine Hall sent at 2/3/2022  3:12 PM EST -----  Subject: Refill Request    QUESTIONS  Name of Medication? lisinopril-hydroCHLOROthiazide (PRINZIDE;ZESTORETIC)   20-12.5 MG per tablet  Patient-reported dosage and instructions? 20-12.5mg  How many days do you have left? 2  Preferred Pharmacy? [id number]  Pharmacy phone number (if available)? [phone]  ---------------------------------------------------------------------------  --------------,  Name of Medication? atorvastatin (LIPITOR) 20 MG tablet  Patient-reported dosage and instructions? 20 mg  How many days do you have left? 1  Preferred Pharmacy? [id number]  Pharmacy phone number (if available)? [phone]  ---------------------------------------------------------------------------  --------------  Jorge MOREIRA  What is the best way for the office to contact you? OK to leave message on   voicemail  Preferred Call Back Phone Number?  [phone]

## 2022-02-03 NOTE — TELEPHONE ENCOUNTER
Scott Enriquez MD  [address] [facility], [address] [address]  Ph: [phone]  Fax: [phone]

## 2022-03-04 NOTE — PROGRESS NOTES
Obstructive Sleep Apnea (FABRIZIO) Screening     Patient:  Nova Henderson    YOB: 1965      Medical Record #:  [id number]                     Date:  3/4/2022     1. Are you a loud and/or regular snorer? []  Yes       [x] No    2. Have you been observed to gasp or stop breathing during sleep? []  Yes       [x] No    3. Do you feel tired or groggy upon awakening or do you awaken with a headache?           []  Yes       [] No    4. Are you often tired or fatigued during the wake time hours? []  Yes       [] No    5. Do you fall asleep sitting, reading, watching TV or driving? []  Yes       [] No    6. Do you often have problems with memory or concentration? []  Yes       [] No    **If patient's score is ? 3 they are considered high risk for FABRIZIO. An Anesthesia provider will evaluate the patient and develop a plan of care the day of surgery. Note:  If the patient's BMI is more than 35 kg m¯² , has neck circumference > 40 cm, and/or high blood pressure the risk is greater (© American Sleep Apnea Association, 2006).

## 2022-03-04 NOTE — PROGRESS NOTES
Preoperative Screening for Elective Surgery/Invasive Procedures While COVID-19 present in the community     Have you had any of the following symptoms? No  o Fever, chills  o Cough  o Shortness of breath  o Muscle aches/pain  o Diarrhea  o Abdominal pain, nausea, vomiting  o Loss or decrease in taste and / or smell   Risk of Exposure  o Have you recently been hospitalized for COVID-19 or flu-like illness, if so when? No  o Recently diagnosed with COVID-19, if so when? No  o Recently tested for COVID-19, if so when? 3/2/22-pending  o Have you been in close contact with a person or family member who currently has or recently had COVID-23? If yes, when and in what context? No  o Do you live with anybody who in the last 14 days has had fever, chills, shortness of breath, muscle aches, flu-like illness? No  o Do you have any close contacts or family members who are currently in the hospital for COVID-19 or flu-like illness? No  If yes, assess recent close contact with this person. Indicate if the patient has a positive screen by answering yes to one or more of the above questions. Patients who test positive or screen positive prior to surgery or on the day of surgery should be evaluated in conjunction with the surgeon/proceduralist/anesthesiologist to determine the urgency of the procedure.

## 2022-03-08 ENCOUNTER — ANESTHESIA (OUTPATIENT)
Dept: ENDOSCOPY | Age: 57
End: 2022-03-08
Payer: COMMERCIAL

## 2022-03-08 ENCOUNTER — ANESTHESIA EVENT (OUTPATIENT)
Dept: ENDOSCOPY | Age: 57
End: 2022-03-08
Payer: COMMERCIAL

## 2022-03-08 ENCOUNTER — HOSPITAL ENCOUNTER (OUTPATIENT)
Age: 57
Setting detail: OUTPATIENT SURGERY
Discharge: HOME | End: 2022-03-08
Attending: INTERNAL MEDICINE | Admitting: INTERNAL MEDICINE
Payer: COMMERCIAL

## 2022-03-08 VITALS
OXYGEN SATURATION: 98 % | WEIGHT: 160 LBS | DIASTOLIC BLOOD PRESSURE: 76 MMHG | HEIGHT: 64 IN | TEMPERATURE: 98 F | SYSTOLIC BLOOD PRESSURE: 118 MMHG | HEART RATE: 76 BPM | BODY MASS INDEX: 27.31 KG/M2 | RESPIRATION RATE: 16 BRPM

## 2022-03-08 VITALS — OXYGEN SATURATION: 92 % | DIASTOLIC BLOOD PRESSURE: 72 MMHG | SYSTOLIC BLOOD PRESSURE: 113 MMHG

## 2022-03-08 DIAGNOSIS — Z12.11 SCREEN FOR COLON CANCER: ICD-10-CM

## 2022-03-08 PROCEDURE — 3700000001 HC ADD 15 MINUTES (ANESTHESIA): Performed by: INTERNAL MEDICINE

## 2022-03-08 PROCEDURE — 2709999900 HC NON-CHARGEABLE SUPPLY: Performed by: INTERNAL MEDICINE

## 2022-03-08 PROCEDURE — 88305 TISSUE EXAM BY PATHOLOGIST: CPT

## 2022-03-08 PROCEDURE — 3700000000 HC ANESTHESIA ATTENDED CARE: Performed by: INTERNAL MEDICINE

## 2022-03-08 PROCEDURE — 7100000010 HC PHASE II RECOVERY - FIRST 15 MIN: Performed by: INTERNAL MEDICINE

## 2022-03-08 PROCEDURE — 2580000003 HC RX 258: Performed by: INTERNAL MEDICINE

## 2022-03-08 PROCEDURE — 3609010600 HC COLONOSCOPY POLYPECTOMY SNARE/COLD BIOPSY: Performed by: INTERNAL MEDICINE

## 2022-03-08 PROCEDURE — 7100000011 HC PHASE II RECOVERY - ADDTL 15 MIN: Performed by: INTERNAL MEDICINE

## 2022-03-08 PROCEDURE — 6360000002 HC RX W HCPCS: Performed by: NURSE ANESTHETIST, CERTIFIED REGISTERED

## 2022-03-08 PROCEDURE — 6370000000 HC RX 637 (ALT 250 FOR IP): Performed by: INTERNAL MEDICINE

## 2022-03-08 PROCEDURE — 2500000003 HC RX 250 WO HCPCS: Performed by: NURSE ANESTHETIST, CERTIFIED REGISTERED

## 2022-03-08 PROCEDURE — 2580000003 HC RX 258: Performed by: ANESTHESIOLOGY

## 2022-03-08 RX ORDER — SIMETHICONE 20 MG/.3ML
EMULSION ORAL PRN
Status: DISCONTINUED | OUTPATIENT
Start: 2022-03-08 | End: 2022-03-08 | Stop reason: ALTCHOICE

## 2022-03-08 RX ORDER — SODIUM CHLORIDE, SODIUM LACTATE, POTASSIUM CHLORIDE, CALCIUM CHLORIDE 600; 310; 30; 20 MG/100ML; MG/100ML; MG/100ML; MG/100ML
INJECTION, SOLUTION INTRAVENOUS ONCE
Status: COMPLETED | OUTPATIENT
Start: 2022-03-08 | End: 2022-03-08

## 2022-03-08 RX ORDER — SODIUM CHLORIDE, SODIUM LACTATE, POTASSIUM CHLORIDE, CALCIUM CHLORIDE 600; 310; 30; 20 MG/100ML; MG/100ML; MG/100ML; MG/100ML
INJECTION, SOLUTION INTRAVENOUS CONTINUOUS
Status: DISCONTINUED | OUTPATIENT
Start: 2022-03-08 | End: 2022-03-08 | Stop reason: HOSPADM

## 2022-03-08 RX ORDER — LIDOCAINE HYDROCHLORIDE 10 MG/ML
0.3 INJECTION, SOLUTION EPIDURAL; INFILTRATION; INTRACAUDAL; PERINEURAL
Status: DISCONTINUED | OUTPATIENT
Start: 2022-03-08 | End: 2022-03-08 | Stop reason: HOSPADM

## 2022-03-08 RX ORDER — SODIUM CHLORIDE 9 MG/ML
25 INJECTION, SOLUTION INTRAVENOUS PRN
Status: DISCONTINUED | OUTPATIENT
Start: 2022-03-08 | End: 2022-03-08 | Stop reason: HOSPADM

## 2022-03-08 RX ORDER — LIDOCAINE HYDROCHLORIDE 20 MG/ML
INJECTION, SOLUTION EPIDURAL; INFILTRATION; INTRACAUDAL; PERINEURAL PRN
Status: DISCONTINUED | OUTPATIENT
Start: 2022-03-08 | End: 2022-03-08 | Stop reason: SDUPTHER

## 2022-03-08 RX ORDER — PROPOFOL 10 MG/ML
INJECTION, EMULSION INTRAVENOUS PRN
Status: DISCONTINUED | OUTPATIENT
Start: 2022-03-08 | End: 2022-03-08 | Stop reason: SDUPTHER

## 2022-03-08 RX ORDER — LIDOCAINE HYDROCHLORIDE 10 MG/ML
0.1 INJECTION, SOLUTION EPIDURAL; INFILTRATION; INTRACAUDAL; PERINEURAL
Status: DISCONTINUED | OUTPATIENT
Start: 2022-03-08 | End: 2022-03-08 | Stop reason: HOSPADM

## 2022-03-08 RX ORDER — SODIUM CHLORIDE 0.9 % (FLUSH) 0.9 %
5-40 SYRINGE (ML) INJECTION EVERY 12 HOURS SCHEDULED
Status: DISCONTINUED | OUTPATIENT
Start: 2022-03-08 | End: 2022-03-08 | Stop reason: HOSPADM

## 2022-03-08 RX ORDER — SODIUM CHLORIDE 0.9 % (FLUSH) 0.9 %
5-40 SYRINGE (ML) INJECTION PRN
Status: DISCONTINUED | OUTPATIENT
Start: 2022-03-08 | End: 2022-03-08 | Stop reason: HOSPADM

## 2022-03-08 RX ADMIN — SODIUM CHLORIDE, SODIUM LACTATE, POTASSIUM CHLORIDE, AND CALCIUM CHLORIDE: .6; .31; .03; .02 INJECTION, SOLUTION INTRAVENOUS at 09:48

## 2022-03-08 RX ADMIN — SODIUM CHLORIDE, POTASSIUM CHLORIDE, SODIUM LACTATE AND CALCIUM CHLORIDE: 600; 310; 30; 20 INJECTION, SOLUTION INTRAVENOUS at 09:02

## 2022-03-08 RX ADMIN — PROPOFOL 50 MG: 10 INJECTION, EMULSION INTRAVENOUS at 10:05

## 2022-03-08 RX ADMIN — PROPOFOL 150 MG: 10 INJECTION, EMULSION INTRAVENOUS at 09:52

## 2022-03-08 RX ADMIN — LIDOCAINE HYDROCHLORIDE 60 MG: 20 INJECTION, SOLUTION EPIDURAL; INFILTRATION; INTRACAUDAL; PERINEURAL at 09:52

## 2022-03-08 NOTE — H&P
Gastroenterology Note             Pre-operative History and Physical    Patient: Christal Polk  : 1965  CSN:     History Obtained From:  patient and/or guardian. HISTORY OF PRESENT ILLNESS:    The patient is a 49 y.o. male  here for colonoscopy. Past Medical History:    Past Medical History:   Diagnosis Date    HTN (hypertension)     Hyperlipidemia     Risk of myocardial infarction or stroke 7.5% or greater in next 10 years 2021    Tooth infection      Past Surgical History:    History reviewed. No pertinent surgical history. Medications Prior to Admission:   No current facility-administered medications on file prior to encounter. Current Outpatient Medications on File Prior to Encounter   Medication Sig Dispense Refill    atorvastatin (LIPITOR) 20 MG tablet Take 1 tablet by mouth daily 90 tablet 1    lisinopril-hydroCHLOROthiazide (PRINZIDE;ZESTORETIC) 20-12.5 MG per tablet Take 2 tablets by mouth daily Take one tablet in the morning, one tablet at night (Patient taking differently: Take 2 tablets by mouth daily ) 180 tablet 1        Allergies:  Patient has no known allergies. Social History:   Social History     Tobacco Use    Smoking status: Never Smoker    Smokeless tobacco: Never Used   Substance Use Topics    Alcohol use: No     Family History:   Family History   Problem Relation Age of Onset    No Known Problems Mother     No Known Problems Father     Heart Disease Other         2 brothers--CABG       PHYSICAL EXAM:      BP (!) 162/87   Pulse 74   Temp 98 °F (36.7 °C)   Resp 16   Ht 5' 4\" (1.626 m)   Wt 160 lb (72.6 kg)   SpO2 100%   BMI 27.46 kg/m²  I        Heart:   RRR, normal s1s2    Lungs:  CTA bilat,  Normal effort    Abdomen:   NT, ND      ASA Grade:  ASA 3 - Patient with moderate systemic disease with functional limitations    Mallampati Class: 2          ASSESSMENT AND PLAN:    1. Patient is a 49 y.o. male here for Colonoscopy with MAC. 2. Procedure options, risks and benefits reviewed with patient. Patient expresses understanding.     Liz Hall MD,   [address]  3/8/2022

## 2022-03-08 NOTE — ANESTHESIA PRE PROCEDURE
Department of Anesthesiology  Preprocedure Note       Name:  Anna Ji   Age:  49 y.o.  :  1965                                          MRN:  [id number]         Date:  3/8/2022      Surgeon: Herminio Baltazar):  Eugenia Jansen MD    Procedure: Procedure(s):  COLONOSCOPY    Medications prior to admission:   Prior to Admission medications    Medication Sig Start Date End Date Taking? Authorizing Provider   atorvastatin (LIPITOR) 20 MG tablet Take 1 tablet by mouth daily 2/3/22  Yes Jean-Pierre Gruber. Ella Verduzco DO   lisinopril-hydroCHLOROthiazide (PRINZIDE;ZESTORETIC) 20-12.5 MG per tablet Take 2 tablets by mouth daily Take one tablet in the morning, one tablet at night  Patient taking differently: Take 2 tablets by mouth daily  2/3/22  Yes Jean-Pierre Verduzco DO       Current medications:    Current Facility-Administered Medications   Medication Dose Route Frequency Provider Last Rate Last Admin    lactated ringers infusion   IntraVENous Once Eugenia Jansen MD        lidocaine PF 1 % injection 0.1 mL  0.1 mL IntraDERmal Once PRN Eugenia Jansen MD        lidocaine PF 1 % injection 0.3 mL  0.3 mL IntraDERmal Once PRN Deborah Oates MD        lactated ringers infusion   IntraVENous Continuous Deborah Oates  mL/hr at 22 0902 New Bag at 22 0902    sodium chloride flush 0.9 % injection 5-40 mL  5-40 mL IntraVENous 2 times per day Deborah Oates MD        sodium chloride flush 0.9 % injection 5-40 mL  5-40 mL IntraVENous PRN Deborah Oates MD        0.9 % sodium chloride infusion  25 mL IntraVENous PRN Deborah Oates MD           Allergies:  No Known Allergies    Problem List:    Patient Active Problem List   Diagnosis Code    Mixed hyperlipidemia E78.2    HTN (hypertension) I10    Risk of myocardial infarction or stroke 7.5% or greater in next 10 years Z91.89       Past Medical History:        Diagnosis Date    HTN (hypertension)     Hyperlipidemia     Risk of myocardial infarction or stroke 7.5% or greater in next 10 years 2/12/2021    Tooth infection        Past Surgical History:  History reviewed. No pertinent surgical history. Social History:    Social History     Tobacco Use    Smoking status: Never Smoker    Smokeless tobacco: Never Used   Substance Use Topics    Alcohol use: No                                Counseling given: Not Answered      Vital Signs (Current):   Vitals:    03/04/22 1206 03/08/22 0857   BP:  (!) 162/87   Pulse:  74   Resp:  16   Temp:  98 °F (36.7 °C)   SpO2:  100%   Weight: 160 lb (72.6 kg)    Height: 5' 4\" (1.626 m)                                               BP Readings from Last 3 Encounters:   03/08/22 (!) 162/87   08/17/21 130/86   02/12/21 (!) 148/92       NPO Status: Time of last liquid consumption: 0300                        Time of last solid consumption: 1800                        Date of last liquid consumption: 03/08/22                        Date of last solid food consumption: 03/06/22    BMI:   Wt Readings from Last 3 Encounters:   03/04/22 160 lb (72.6 kg)   08/17/21 162 lb 3.2 oz (73.6 kg)   02/12/21 166 lb 9.6 oz (75.6 kg)     Body mass index is 27.46 kg/m².     CBC:   Lab Results   Component Value Date    WBC 4.9 08/02/2018    RBC 5.48 08/02/2018    HGB 15.9 08/02/2018    HCT 48.3 08/02/2018    MCV 88.2 08/02/2018    RDW 14.7 08/02/2018     08/02/2018       CMP:   Lab Results   Component Value Date     02/12/2021    K 4.2 02/12/2021    CL 99 02/12/2021    CO2 29 02/12/2021    BUN 9 02/12/2021    CREATININE 0.9 02/12/2021    GFRAA >60 02/12/2021    GFRAA >60 10/01/2011    AGRATIO 1.6 11/12/2020    LABGLOM >60 02/12/2021    GLUCOSE 91 02/12/2021    PROT 7.2 11/12/2020    PROT 7.3 10/01/2011    CALCIUM 9.8 02/12/2021    BILITOT 0.7 11/12/2020    ALKPHOS 87 11/12/2020    AST 20 11/12/2020    ALT 25 11/12/2020       POC Tests: No results for input(s): POCGLU, POCNA, POCK, POCCL, POCBUN, POCHEMO, POCHCT in the last 72 hours.    Coags: No results found for: PROTIME, INR, APTT    HCG (If Applicable): No results found for: PREGTESTUR, PREGSERUM, HCG, HCGQUANT     ABGs: No results found for: PHART, PO2ART, CLC7FJK, EXF8EVF, BEART, [id number]     Type & Screen (If Applicable):  No results found for: LABABO, LABRH    Drug/Infectious Status (If Applicable):  No results found for: HIV, HEPCAB    COVID-19 Screening (If Applicable): No results found for: COVID19        Anesthesia Evaluation  Patient summary reviewed and Nursing notes reviewed  Airway: Mallampati: III  TM distance: >3 FB   Neck ROM: full  Mouth opening: < 3 FB Dental: normal exam         Pulmonary:Negative Pulmonary ROS and normal exam  breath sounds clear to auscultation                             Cardiovascular:    (+) hypertension:,         Rhythm: regular  Rate: normal                    Neuro/Psych:   Negative Neuro/Psych ROS              GI/Hepatic/Renal: Neg GI/Hepatic/Renal ROS            Endo/Other: Negative Endo/Other ROS                    Abdominal:             Vascular: negative vascular ROS. Other Findings:             Anesthesia Plan      MAC     ASA 2       Induction: intravenous. Anesthetic plan and risks discussed with patient. Plan discussed with CRNA.                   Tamie Aguirre MD   3/8/2022

## 2022-03-08 NOTE — PROCEDURES
Colonoscopy Procedure  Note          Patient: Newton Marie  : 1965  CSN:     Procedure: Colonoscopy with polyp removal    Date:  3/8/2022    Surgeon:  Elana Riley MD, MD    Referring Physician:  Dr. Candace Guerrero DO    Preoperative Diagnosis:  Screening for colon cancer    Postoperative Diagnosis:  Colon polyps removed    Anesthesia:  Propofol    EBL: <5 mL    Indications: This is a 49y.o. year old male who presents today with screening for colon cancer. Procedure: An informed consent was obtained from the patient after explanation of indications, benefits, possible risks and complications of the procedure. The patient was then taken to the endoscopy suite, placed in the left lateral decubitus position, and the above IV anesthesia was administered. With the patient in left lateral decubitus position digital rectal examination was performed, no abnormalities noted. The scope was advanced all the way to the cecum, the mucosa appeared normal.  Appendix was identified. The terminal ileum was briefly intubated, the mucosa appeared normal.  The mucosa in the ascending, transverse, descending, sigmoid and rectum appeared normal.  In the cecum a 1mm polyp was removed with a cold biopsy forceps. In the transverse colon two 1mm polyps were removed with a cold biopsy forceps. On retroflexion no abnormalities were noted. The scope was straightened, the colon was decompressed and the scope was withdrawn from the patient. The patient tolerated the procedure well and was taken to the PACU in good condition. Impression:  Small polyps removed    Recommendations:  Await pathology. Voicemail left for the patient, 5 year recall entered for the adenomatous polyp.     Elana Riley MD, MD   [facility]  3/8/2022

## 2022-03-08 NOTE — ANESTHESIA POSTPROCEDURE EVALUATION
Department of Anesthesiology  Postprocedure Note    Patient: Fallon Roper  MRN: [id number]  YOB: 1965  Date of evaluation: 3/8/2022  Time:  12:54 PM     Procedure Summary     Date: 03/08/22 Room / Location: [address]    Anesthesia Start: 1420 Anesthesia Stop: 12    Procedure: COLONOSCOPY POLYPECTOMY SNARE/COLD BIOPSY (N/A ) Diagnosis:       Screen for colon cancer      (Screen for colon cancer [Z12.11])    Surgeons: Eloina Heck MD Responsible Provider: Duong Fairbanks MD    Anesthesia Type: MAC ASA Status: 2          Anesthesia Type: MAC    Maricarmen Phase I: Maricarmen Score: 10    Maricarmen Phase II: Maricarmen Score: 8    Last vitals: Reviewed and per EMR flowsheets.        Anesthesia Post Evaluation    Patient location during evaluation: PACU  Patient participation: complete - patient participated  Level of consciousness: awake and alert  Pain score: 0  Airway patency: patent  Nausea & Vomiting: no nausea and no vomiting  Complications: no  Cardiovascular status: blood pressure returned to baseline  Respiratory status: acceptable  Hydration status: stable

## 2022-04-25 ENCOUNTER — OFFICE VISIT (OUTPATIENT)
Dept: PRIMARY CARE CLINIC | Age: 57
End: 2022-04-25
Payer: COMMERCIAL

## 2022-04-25 ENCOUNTER — HOSPITAL ENCOUNTER (OUTPATIENT)
Age: 57
Discharge: HOME | End: 2022-04-25
Payer: COMMERCIAL

## 2022-04-25 VITALS
HEART RATE: 76 BPM | DIASTOLIC BLOOD PRESSURE: 86 MMHG | RESPIRATION RATE: 16 BRPM | TEMPERATURE: 97.2 F | OXYGEN SATURATION: 99 % | WEIGHT: 165.2 LBS | SYSTOLIC BLOOD PRESSURE: 138 MMHG | HEIGHT: 64 IN | BODY MASS INDEX: 28.2 KG/M2

## 2022-04-25 DIAGNOSIS — Z13.1 DIABETES MELLITUS SCREENING: ICD-10-CM

## 2022-04-25 DIAGNOSIS — Z11.4 ENCOUNTER FOR SCREENING FOR HIV: ICD-10-CM

## 2022-04-25 DIAGNOSIS — Z00.00 ANNUAL PHYSICAL EXAM: ICD-10-CM

## 2022-04-25 DIAGNOSIS — E78.2 MIXED HYPERLIPIDEMIA: ICD-10-CM

## 2022-04-25 DIAGNOSIS — Z00.00 ANNUAL PHYSICAL EXAM: Primary | ICD-10-CM

## 2022-04-25 DIAGNOSIS — Z91.89 RISK OF MYOCARDIAL INFARCTION OR STROKE 7.5% OR GREATER IN NEXT 10 YEARS: ICD-10-CM

## 2022-04-25 DIAGNOSIS — Z13.220 LIPID SCREENING: ICD-10-CM

## 2022-04-25 DIAGNOSIS — Z00.00 HEALTH CARE MAINTENANCE: ICD-10-CM

## 2022-04-25 DIAGNOSIS — I10 PRIMARY HYPERTENSION: ICD-10-CM

## 2022-04-25 DIAGNOSIS — I10 ESSENTIAL HYPERTENSION: ICD-10-CM

## 2022-04-25 DIAGNOSIS — Z11.59 NEED FOR HEPATITIS C SCREENING TEST: ICD-10-CM

## 2022-04-25 DIAGNOSIS — Z23 NEED FOR SHINGLES VACCINE: ICD-10-CM

## 2022-04-25 LAB
A/G RATIO: 2 (ref 1.1–2.2)
ALBUMIN SERPL-MCNC: 4.7 G/DL (ref 3.4–5)
ALP BLD-CCNC: 63 U/L (ref 40–129)
ALT SERPL-CCNC: 17 U/L (ref 10–40)
ANION GAP SERPL CALCULATED.3IONS-SCNC: 13 MMOL/L (ref 3–16)
AST SERPL-CCNC: 19 U/L (ref 15–37)
BILIRUB SERPL-MCNC: 0.6 MG/DL (ref 0–1)
BUN BLDV-MCNC: 12 MG/DL (ref 7–20)
CALCIUM SERPL-MCNC: 9.3 MG/DL (ref 8.3–10.6)
CHLORIDE BLD-SCNC: 102 MMOL/L (ref 99–110)
CHOLESTEROL, TOTAL: 232 MG/DL (ref 0–199)
CO2: 26 MMOL/L (ref 21–32)
CREAT SERPL-MCNC: 0.9 MG/DL (ref 0.9–1.3)
GFR AFRICAN AMERICAN: >60
GFR NON-AFRICAN AMERICAN: >60
GLUCOSE BLD-MCNC: 85 MG/DL (ref 70–99)
HDLC SERPL-MCNC: 35 MG/DL (ref 40–60)
HEPATITIS C ANTIBODY INTERPRETATION: NORMAL
LDL CHOLESTEROL CALCULATED: 150 MG/DL
POTASSIUM SERPL-SCNC: 4.4 MMOL/L (ref 3.5–5.1)
SODIUM BLD-SCNC: 141 MMOL/L (ref 136–145)
TOTAL PROTEIN: 7.1 G/DL (ref 6.4–8.2)
TRIGL SERPL-MCNC: 236 MG/DL (ref 0–150)
VLDLC SERPL CALC-MCNC: 47 MG/DL

## 2022-04-25 PROCEDURE — 80053 COMPREHEN METABOLIC PANEL: CPT

## 2022-04-25 PROCEDURE — 90750 HZV VACC RECOMBINANT IM: CPT | Performed by: FAMILY MEDICINE

## 2022-04-25 PROCEDURE — 86803 HEPATITIS C AB TEST: CPT

## 2022-04-25 PROCEDURE — 36415 COLL VENOUS BLD VENIPUNCTURE: CPT

## 2022-04-25 PROCEDURE — 90471 IMMUNIZATION ADMIN: CPT | Performed by: FAMILY MEDICINE

## 2022-04-25 PROCEDURE — 86702 HIV-2 ANTIBODY: CPT

## 2022-04-25 PROCEDURE — 80061 LIPID PANEL: CPT

## 2022-04-25 PROCEDURE — 99396 PREV VISIT EST AGE 40-64: CPT | Performed by: FAMILY MEDICINE

## 2022-04-25 PROCEDURE — 86701 HIV-1ANTIBODY: CPT

## 2022-04-25 PROCEDURE — 87390 HIV-1 AG IA: CPT

## 2022-04-25 RX ORDER — LISINOPRIL AND HYDROCHLOROTHIAZIDE 20; 12.5 MG/1; MG/1
2 TABLET ORAL DAILY
Qty: 180 TABLET | Refills: 3 | Status: SHIPPED | OUTPATIENT
Start: 2022-04-25 | End: 2022-10-25 | Stop reason: SDUPTHER

## 2022-04-25 RX ORDER — ATORVASTATIN CALCIUM 20 MG/1
20 TABLET, FILM COATED ORAL DAILY
Qty: 90 TABLET | Refills: 3 | Status: SHIPPED | OUTPATIENT
Start: 2022-04-25 | End: 2022-10-25

## 2022-04-25 RX ORDER — COVID-19 TEST SPECIMEN COLLECT
MISCELLANEOUS MISCELLANEOUS
COMMUNITY
Start: 2022-03-02 | End: 2022-10-25

## 2022-04-25 SDOH — ECONOMIC STABILITY: FOOD INSECURITY: WITHIN THE PAST 12 MONTHS, THE FOOD YOU BOUGHT JUST DIDN'T LAST AND YOU DIDN'T HAVE MONEY TO GET MORE.: NEVER TRUE

## 2022-04-25 SDOH — ECONOMIC STABILITY: FOOD INSECURITY: WITHIN THE PAST 12 MONTHS, YOU WORRIED THAT YOUR FOOD WOULD RUN OUT BEFORE YOU GOT MONEY TO BUY MORE.: NEVER TRUE

## 2022-04-25 ASSESSMENT — ENCOUNTER SYMPTOMS
SORE THROAT: 0
EYE PAIN: 0
RHINORRHEA: 0
CONSTIPATION: 0
BLOOD IN STOOL: 0
BACK PAIN: 0
SHORTNESS OF BREATH: 0
COLOR CHANGE: 0
ABDOMINAL PAIN: 0
DIARRHEA: 0
EYE DISCHARGE: 0
WHEEZING: 0

## 2022-04-25 ASSESSMENT — LIFESTYLE VARIABLES: HOW OFTEN DO YOU HAVE A DRINK CONTAINING ALCOHOL: NEVER

## 2022-04-25 ASSESSMENT — SOCIAL DETERMINANTS OF HEALTH (SDOH): HOW HARD IS IT FOR YOU TO PAY FOR THE VERY BASICS LIKE FOOD, HOUSING, MEDICAL CARE, AND HEATING?: NOT HARD AT ALL

## 2022-04-25 NOTE — PROGRESS NOTES
Mirna Hernandez  1965    Consultants:   Patient Care Team:  Jie Gunter DO as PCP - General (Family Medicine)  Jie Gunter DO as PCP - [facility]    Chief Complaint:       Chief Complaint   Patient presents with    Annual Exam     annual exam today    Hyperlipidemia     optimized on statin    Hypertension     at goal    Immunizations     due for shingles dose 2 today    Health Maintenance     up to date     HPI:     Mirna Hernandez is a 49 y.o. male  established patient who presents for annual exam:    Hyperlipidemia:  On atorvastatin 20 mg daily  The 10-year ASCVD risk score (Ivy Lozoya, et al., 2013) is: 13.2%    Values used to calculate the score:      Age: 49 years      Sex: Male      Is Non- : No      Diabetic: No      Tobacco smoker: No      Systolic Blood Pressure: 542 mmHg      Is BP treated: Yes      HDL Cholesterol: 31 mg/dL      Total Cholesterol: 246 mg/dL    Lab Results   Component Value Date    CHOL 246 (H) 11/12/2020    CHOL 211 (H) 08/02/2018    CHOL 228 (H) 01/07/2016     Lab Results   Component Value Date    TRIG 324 (H) 11/12/2020    TRIG 265 (H) 08/02/2018    TRIG 282 (H) 01/07/2016     Lab Results   Component Value Date    HDL 31 (L) 11/12/2020    HDL 31 (L) 08/02/2018    HDL 34 (L) 01/07/2016     Lab Results   Component Value Date    LDLCALC see below 11/12/2020    LDLCALC 127 (H) 08/02/2018    LDLCALC 138 (H) 01/07/2016     Lab Results   Component Value Date    LABVLDL see below 11/12/2020    LABVLDL 53 08/02/2018    LABVLDL 56 01/07/2016     No results found for: CHOLHDLRATIO    -HTN:  Lisinopril hctz 20-12.5 mg 2 tablets daily  Didn't get refill ran out of    BP Readings from Last 3 Encounters:   04/25/22 138/86   03/08/22 118/76   03/08/22 113/72     PHQ-9 Total Score: 0 (4/25/2022 11:05 AM)        Patient Active Problem List   Diagnosis    Mixed hyperlipidemia    HTN (hypertension)    Risk of myocardial infarction or stroke 7.5% or greater in next 10 years         Past Medical History:    Past Medical History:   Diagnosis Date    HTN (hypertension)     Hyperlipidemia     Risk of myocardial infarction or stroke 7.5% or greater in next 10 years 2/12/2021    Tooth infection        Past Surgical History:  Past Surgical History:   Procedure Laterality Date    COLONOSCOPY N/A 3/8/2022    COLONOSCOPY POLYPECTOMY SNARE/COLD BIOPSY performed by Jil Rivera MD at [address]:  Prior to Visit Medications    Medication Sig Taking? Authorizing Provider   atorvastatin (LIPITOR) 20 MG tablet Take 1 tablet by mouth daily Yes Krystal Paniagua. Cristal Easley., DO   lisinopril-hydroCHLOROthiazide (PRINZIDE;ZESTORETIC) 20-12.5 MG per tablet Take 2 tablets by mouth daily Take one tablet in the morning, one tablet at night  Patient taking differently: Take 2 tablets by mouth daily  Yes Krystal Paniagua. Cristal Easley., DO   COVID-19 Specimen Collection KIT TEST AS DIRECTED TODAY  Historical Provider, MD       Allergies:    Patient has no known allergies.     Family History:       Problem Relation Age of Onset    No Known Problems Mother     No Known Problems Father     Heart Disease Other         2 brothers--CABG       Social History  Social History     Socioeconomic History    Marital status:      Spouse name: Azra Mauricio    Number of children: 2    Years of education: 23    Highest education level: Master's degree (e.g., MA, MS, Petey, MEd, MSW, KOKI)   Occupational History    Occupation: [employer]   Tobacco Use    Smoking status: Never Smoker    Smokeless tobacco: Never Used   Vaping Use    Vaping Use: Never used   Substance and Sexual Activity    Alcohol use: No    Drug use: No    Sexual activity: Yes     Partners: Female   Other Topics Concern    Not on file   Social History Narrative    -Lives in [place], was in [employer] [place] before    -moved there in 2004, was in [employer] 4 or 5 years prior     Social Determinants of Health     Financial Resource Strain: Low Risk     Difficulty of Paying Living Expenses: Not hard at all   Food Insecurity: No Food Insecurity    Worried About Running Out of Food in the Last Year: Never true    Ez of Food in the Last Year: Never true   Transportation Needs: No Transportation Needs    Lack of Transportation (Medical): No    Lack of Transportation (Non-Medical):  No   Physical Activity:     Days of Exercise per Week: Not on file    Minutes of Exercise per Session: Not on file   Stress:     Feeling of Stress : Not on file   Social Connections:     Frequency of Communication with Friends and Family: Not on file    Frequency of Social Gatherings with Friends and Family: Not on file    Attends Orthodox Services: Not on file    Active Member of [address] [place] [address] [employer] or Organizations: Not on file    Attends Club or Organization Meetings: Not on file    Marital Status: Not on file   Intimate Partner Violence:     Fear of Current or Ex-Partner: Not on file    Emotionally Abused: Not on file    Physically Abused: Not on file    Sexually Abused: Not on file   Housing Stability:     Unable to Pay for Housing in the Last Year: Not on file    Number of [place] in the Last Year: Not on file    Unstable Housing in the Last Year: Not on file         Health Maintenance Completed:  Health Maintenance   Topic Date Due    HIV screen  Never done    Hepatitis C screen  Never done    Shingles Vaccine (2 of 2) 10/12/2021    Lipids  11/12/2021    Depression Screen  02/12/2022    Potassium  02/12/2022    Creatinine  02/12/2022    Flu vaccine (Season Ended) 09/01/2022    DTaP/Tdap/Td vaccine (2 - Td or Tdap) 01/07/2026    Colorectal Cancer Screen  03/08/2027    COVID-19 Vaccine  Completed    Hepatitis A vaccine  Aged Out    Hepatitis B vaccine  Aged Out    Hib vaccine  Aged Out    Meningococcal (ACWY) vaccine  Aged Out    Pneumococcal 0-64 years Vaccine  Aged Kris Immunization History   Administered Date(s) Administered    COVID-19, Maikol Bejarano, Primary or Immunocompromised, PF, 100mcg/0.5mL 03/27/2021, 04/24/2021, 12/08/2021    COVID-19, Pfizer Purple top, DILUTE for use, 12+ yrs, 30mcg/0.3mL dose 04/30/2021    COVID-19, [place] Vaccine, Vaccine Unspecified 04/14/2021    Influenza, Quadv, IM, PF (6 mo and older Fluzone, Flulaval, Fluarix, and 3 yrs and older Afluria) 11/12/2020    Td, unspecified formulation 01/23/2006    Tdap (Boostrix, Adacel) 01/07/2016    Zoster Recombinant (Shingrix) 08/17/2021     Review of Systems   Constitutional: Negative for chills, fever and unexpected weight change. HENT: Negative for congestion, rhinorrhea and sore throat. Eyes: Negative for pain, discharge and visual disturbance. Respiratory: Negative for shortness of breath and wheezing. Cardiovascular: Negative for chest pain and leg swelling. Gastrointestinal: Negative for abdominal pain, blood in stool, constipation and diarrhea. Endocrine: Negative for polyuria. Genitourinary: Negative for dysuria and flank pain. Musculoskeletal: Positive for arthralgias. Negative for back pain and neck pain. Skin: Negative for color change, rash and wound. Allergic/Immunologic: Negative for environmental allergies, food allergies and immunocompromised state. Neurological: Negative for dizziness, speech difficulty, weakness, light-headedness and headaches. Hematological: Negative for adenopathy. Does not bruise/bleed easily. Psychiatric/Behavioral: Negative for dysphoric mood and sleep disturbance. The patient is not nervous/anxious. Physical Exam:   Vitals:    04/25/22 1106   BP: 138/86   Site: Left Upper Arm   Position: Sitting   Cuff Size: Medium Adult   Pulse: 76   Resp: 16   Temp: 97.2 °F (36.2 °C)   TempSrc: Infrared   SpO2: 99%   Weight: 165 lb 3.2 oz (74.9 kg)   Height: 5' 4\" (1.626 m)     Body mass index is 28.36 kg/m².     Wt Readings from Last 3 Encounters: 04/25/22 165 lb 3.2 oz (74.9 kg)   03/04/22 160 lb (72.6 kg)   08/17/21 162 lb 3.2 oz (73.6 kg)       BP Readings from Last 3 Encounters:   04/25/22 138/86   03/08/22 118/76   03/08/22 113/72       Physical Exam  Constitutional:       General: He is not in acute distress. Appearance: He is well-developed. HENT:      Head: Normocephalic and atraumatic. Right Ear: Tympanic membrane normal.      Left Ear: Tympanic membrane normal.      Nose: Nose normal. No rhinorrhea. Mouth/Throat:      Pharynx: Uvula midline. Eyes:      Pupils: Pupils are equal, round, and reactive to light. Neck:      Trachea: No tracheal deviation. Cardiovascular:      Rate and Rhythm: Normal rate and regular rhythm. Heart sounds: Normal heart sounds. No murmur heard. No friction rub. No gallop. Pulmonary:      Effort: Pulmonary effort is normal. No respiratory distress. Breath sounds: Normal breath sounds. No wheezing or rales. Abdominal:      General: Bowel sounds are normal. There is no distension. Palpations: Abdomen is soft. Tenderness: There is no abdominal tenderness. There is no rebound. Musculoskeletal:         General: No tenderness. Normal range of motion. Lymphadenopathy:      Cervical: No cervical adenopathy. Skin:     General: Skin is warm and dry. Findings: No erythema or rash. Neurological:      Mental Status: He is alert and oriented to person, place, and time. Cranial Nerves: No cranial nerve deficit. Psychiatric:         Speech: Speech normal.         Thought Content: Thought content does not include homicidal or suicidal ideation.               Lab Review:     Lab Results   Component Value Date     02/12/2021    K 4.2 02/12/2021    CL 99 02/12/2021    CO2 29 02/12/2021    BUN 9 02/12/2021    CREATININE 0.9 02/12/2021    GLUCOSE 91 02/12/2021    CALCIUM 9.8 02/12/2021    PROT 7.2 11/12/2020    LABALBU 4.4 11/12/2020    BILITOT 0.7 11/12/2020    ALKPHOS 87 carbohydrates (boxed/canned/frozen/fast)  -encourage healthy protein and fat, butter, avocado, egg  - Comprehensive Metabolic Panel; Future  - LIPID PANEL; Future  - Hepatitis C Antibody; Future  - HIV Screen; Future  - Zoster recombinant [facility])    3. Essential hypertension  4. Primary hypertension  At goal; continue current medication; recommend DASH Diet, 150 mins CV activity  - Comprehensive Metabolic Panel; Future  - lisinopril-hydroCHLOROthiazide (PRINZIDE;ZESTORETIC) 20-12.5 MG per tablet; Take 2 tablets by mouth daily Take one tablet in the morning, one tablet at night  Dispense: 180 tablet; Refill: 3    5. Lipid screening  6. Risk of myocardial infarction or stroke 7.5% or greater in next 10 years  7. Mixed hyperlipidemia  On moderate intensity statin; repeat lipid panel, titrate to goal  - LIPID PANEL; Future  - atorvastatin (LIPITOR) 20 MG tablet; Take 1 tablet by mouth daily  Dispense: 90 tablet; Refill: 3      8. Diabetes mellitus screening  - Comprehensive Metabolic Panel; Future    9. Need for hepatitis C screening test  - Hepatitis C Antibody; Future    10. Encounter for screening for HIV  - HIV Screen; Future      11.  Need for shingles vaccine  Dose 2/2 given today  - Zoster recombinant [facility])        Health Maintenance Due:  Health Maintenance Due   Topic Date Due    HIV screen  Never done    Hepatitis C screen  Never done    Shingles Vaccine (2 of 2) 10/12/2021    Lipids  11/12/2021    Depression Screen  02/12/2022    Potassium  02/12/2022    Creatinine  02/12/2022        Health Maintenance:  (M) AAA Screen: (men 73-67 yo who has ever smoked (B), consider in nonsmokers if high risk): NA  CRC/Colonoscopy Screening: 3/8/22 repeat in 5 years due to adenomatous polyp  HIV Screen: (15-65 yr old, and all pregnant patients): get today  Hep C Screen: (18-79 yr old): get today    Immunizations:  shingrix today, get covid booster 4th dose at patient conveneince    Return in about 6 months (around 10/25/2022) for Blood pressure, cholesterol, medication check. EMR Dragon/transcription disclaimer:  Much of this encounter note is electronic transcription/translation of spoken language to printed texts. The electronic translation of spoken language may be erroneous, or at times, nonsensical words or phrases may be inadvertently transcribed. Although I have reviewed the note for such errors, some may still exist.       Lien Peralta.  Alexa Peñaloza., DO  4/25/2022

## 2022-04-25 NOTE — PATIENT INSTRUCTIONS
-Get blood work    -Get 4th dose booster of COVID vaccine    -Recommend 150 minutes of cardiovascular activity a week, or 10,000 to 15,000 steps a day, 2 days of weightbearing  -dietary wise, try to stick to your weight x 10 in calories a day  -avoid processed/refined carbohydrates (boxed/canned/frozen/fast)  -encourage healthy protein and fat, butter, avocado, egg    Patient Education        Well Visit, Men 48 to 72: Care Instructions  Overview     Well visits can help you stay healthy. Your doctor has checked your overall health and may have suggested ways to take good care of yourself. Your doctor also may have recommended tests. At home, you can help prevent illness withhealthy eating, regular exercise, and other steps. Follow-up care is a key part of your treatment and safety. Be sure to make and go to all appointments, and call your doctor if you are having problems. It's also a good idea to know your test results and keep alist of the medicines you take. How can you care for yourself at home?  Get screening tests that you and your doctor decide on. Screening helps find diseases before any symptoms appear.  Eat healthy foods. Choose fruits, vegetables, whole grains, protein, and low-fat dairy foods. Limit fat, especially saturated fat. Reduce salt in your diet.  Limit alcohol. Have no more than 2 drinks a day or 14 drinks a week.  Get at least 30 minutes of exercise on most days of the week. Walking is a good choice. You also may want to do other activities, such as running, swimming, cycling, or playing tennis or team sports.  Reach and stay at a healthy weight. This will lower your risk for many problems, such as obesity, diabetes, heart disease, and high blood pressure.  Do not smoke. Smoking can make health problems worse. If you need help quitting, talk to your doctor about stop-smoking programs and medicines. These can increase your chances of quitting for good.    Care for your mental health. It is easy to get weighed down by worry and stress. Learn strategies to manage stress, like deep breathing and mindfulness, and stay connected with your family and community. If you find you often feel sad or hopeless, talk with your doctor. Treatment can help.  Talk to your doctor about whether you have any risk factors for sexually transmitted infections (STIs). You can help prevent STIs if you wait to have sex with a new partner (or partners) until you've each been tested for STIs. It also helps if you use condoms (male or female condoms) and if you limit your sex partners to one person who only has sex with you. Vaccines are available for some STIs.  If it's important to you to prevent pregnancy with your partner, talk with your doctor about birth control options that might be best for you.  If you think you may have a problem with alcohol or drug use, talk to your doctor. This includes prescription medicines (such as amphetamines and opioids) and illegal drugs (such as cocaine and methamphetamine). Your doctor can help you figure out what type of treatment is best for you.  Protect your skin from too much sun. When you're outdoors from 10 a.m. to 4 p.m., stay in the shade or cover up with clothing and a hat with a wide brim. Wear sunglasses that block UV rays. Even when it's cloudy, put broad-spectrum sunscreen (SPF 30 or higher) on any exposed skin.  See a dentist one or two times a year for checkups and to have your teeth cleaned.  Wear a seat belt in the car. When should you call for help? Watch closely for changes in your health, and be sure to contact your doctor if you have any problems or symptoms that concern you. Where can you learn more? Go to https://nader.health-partners. org and sign in to your Junko Tada account. Enter T619 in the Lapio box to learn more about \"Well Visit, Men 48 to 72: Care Instructions. \"     If you do not have an account, please click on the \"Sign Up Now\" link. Current as of: October 6, 2021               Content Version: 13.2  © 2006-2022 [employer]. Care instructions adapted under license by [facility] ([facility]). If you have questions about a medical condition or this instruction, always ask your healthcare professional. [address] any warranty or liability for your use of this information. Patient Education        Knee Arthritis: Exercises  Introduction  Here are some examples of exercises for you to try. The exercises may be suggested for a condition or for rehabilitation. Start each exercise slowly. Ease off the exercises if you start to have pain. You will be told when to start these exercises and which ones will work bestfor you. How to do the exercises  Knee flexion with heel slide    1. Lie on your back with your knees bent. 2. Slide your heel back by bending your affected knee as far as you can. Then hook your other foot around your ankle to help pull your heel even farther back. 3. Hold for about 6 seconds, then rest for up to 10 seconds. 4. Repeat 8 to 12 times. 5. Switch legs and repeat steps 1 through 4, even if only one knee is sore. Quad sets    1. Sit with your affected leg straight and supported on the floor or a firm bed. Place a small, rolled-up towel under your knee. Your other leg should be bent, with that foot flat on the floor. 2. Tighten the thigh muscles of your affected leg by pressing the back of your knee down into the towel. 3. Hold for about 6 seconds, then rest for up to 10 seconds. 4. Repeat 8 to 12 times. 5. Switch legs and repeat steps 1 through 4, even if only one knee is sore. Straight-leg raises to the front    1. Lie on your back with your good knee bent so that your foot rests flat on the floor. Your affected leg should be straight. Make sure that your low back has a normal curve.  You should be able to slip your hand in between the floor and the small of your back, with your palm touching the floor and your back touching the back of your hand. 2. Tighten the thigh muscles in your affected leg by pressing the back of your knee flat down to the floor. Hold your knee straight. 3. Keeping the thigh muscles tight and your leg straight, lift your affected leg up so that your heel is about 12 inches off the floor. Hold for about 6 seconds, then lower slowly. 4. Relax for up to 10 seconds between repetitions. 5. Repeat 8 to 12 times. 6. Switch legs and repeat steps 1 through 5, even if only one knee is sore. Active knee flexion    1. Lie on your stomach with your knees straight. If your kneecap is uncomfortable, roll up a washcloth and put it under your leg just above your kneecap. 2. Lift the foot of your affected leg by bending the knee so that you bring the foot up toward your buttock. If this motion hurts, try it without bending your knee quite as far. This may help you avoid any painful motion. 3. Slowly move your leg up and down. 4. Repeat 8 to 12 times. 5. Switch legs and repeat steps 1 through 4, even if only one knee is sore. Quadriceps stretch (facedown)    1. Lie flat on your stomach, and rest your face on the floor. 2. Wrap a towel or belt strap around the lower part of your affected leg. Then use the towel or belt strap to slowly pull your heel toward your buttock until you feel a stretch. 3. Hold for about 15 to 30 seconds, then relax your leg against the towel or belt strap. 4. Repeat 2 to 4 times. 5. Switch legs and repeat steps 1 through 4, even if only one knee is sore. Stationary exercise bike    1. If you do not have a stationary exercise bike at home, you can find one to ride at your local health club or community center. 2. Adjust the height of the bike seat so that your knee is slightly bent when your leg is extended downward.  If your knee hurts when the pedal reaches the top, you can raise the seat so that your knee does not bend as much. 3. Start slowly. At first, try to do 5 to 10 minutes of cycling with little to no resistance. Then increase your time and the resistance bit by bit until you can do 20 to 30 minutes without pain. 4. If you start to have pain, rest your knee until your pain gets back to the level that is normal for you. Or cycle for less time or with less effort. Follow-up care is a key part of your treatment and safety. Be sure to make and go to all appointments, and call your doctor if you are having problems. It's also a good idea to know your test results and keep alist of the medicines you take. Where can you learn more? Go to https://Priceza.CNS Response. org and sign in to your frooly account. Enter C159 in the PoshVine box to learn more about \"Knee Arthritis: Exercises. \"     If you do not have an account, please click on the \"Sign Up Now\" link. Current as of: July 1, 2021               Content Version: 13.2  © 2006-2022 Healthwise, Incorporated. Care instructions adapted under license by [facility] ([facility]). If you have questions about a medical condition or this instruction, always ask your healthcare professional. [place] [address] any warranty or liability for your use of this information.

## 2022-04-26 LAB
HIV AG/AB: NORMAL
HIV ANTIGEN: NORMAL
HIV-1 ANTIBODY: NORMAL
HIV-2 AB: NORMAL

## 2022-05-12 ENCOUNTER — TELEPHONE (OUTPATIENT)
Dept: PRIMARY CARE CLINIC | Age: 57
End: 2022-05-12

## 2022-05-12 NOTE — TELEPHONE ENCOUNTER
Result note from Dr. Veronica Briseno:  Hepatitis C and HIV screening negative  Total and LDL cholesterol elevated as in years past  Triglycerides elevated but down about 100 points from last year  The 10-year ASCVD risk score (Venice Joy, et al., 2013) is: 11.1%  Kidneys, liver, electrolytes look good     Would recommend going up to 40 mg daily of lipitor  If okay with this please let me know and can send new script to pharmacy                PT returning our call after receiving a letter. He does not want to increase his dose at this time he wants to wait and see if it changes at his 6 month f/u.

## 2022-10-25 ENCOUNTER — OFFICE VISIT (OUTPATIENT)
Dept: PRIMARY CARE CLINIC | Age: 57
End: 2022-10-25
Payer: COMMERCIAL

## 2022-10-25 VITALS
HEIGHT: 64 IN | DIASTOLIC BLOOD PRESSURE: 82 MMHG | RESPIRATION RATE: 18 BRPM | HEART RATE: 79 BPM | BODY MASS INDEX: 28.2 KG/M2 | TEMPERATURE: 97.4 F | OXYGEN SATURATION: 99 % | WEIGHT: 165.2 LBS | SYSTOLIC BLOOD PRESSURE: 128 MMHG

## 2022-10-25 DIAGNOSIS — E78.2 MIXED HYPERLIPIDEMIA: Primary | ICD-10-CM

## 2022-10-25 DIAGNOSIS — I10 PRIMARY HYPERTENSION: ICD-10-CM

## 2022-10-25 DIAGNOSIS — E66.3 OVERWEIGHT (BMI 25.0-29.9): ICD-10-CM

## 2022-10-25 DIAGNOSIS — I10 ESSENTIAL HYPERTENSION: ICD-10-CM

## 2022-10-25 DIAGNOSIS — Z91.89 RISK OF MYOCARDIAL INFARCTION OR STROKE 7.5% OR GREATER IN NEXT 10 YEARS: ICD-10-CM

## 2022-10-25 DIAGNOSIS — Z23 NEED FOR INFLUENZA VACCINATION: ICD-10-CM

## 2022-10-25 DIAGNOSIS — Z00.00 ANNUAL PHYSICAL EXAM: ICD-10-CM

## 2022-10-25 PROCEDURE — 90674 CCIIV4 VAC NO PRSV 0.5 ML IM: CPT | Performed by: FAMILY MEDICINE

## 2022-10-25 PROCEDURE — 3078F DIAST BP <80 MM HG: CPT

## 2022-10-25 PROCEDURE — 3074F SYST BP LT 130 MM HG: CPT

## 2022-10-25 PROCEDURE — 90471 IMMUNIZATION ADMIN: CPT | Performed by: FAMILY MEDICINE

## 2022-10-25 PROCEDURE — 99214 OFFICE O/P EST MOD 30 MIN: CPT

## 2022-10-25 RX ORDER — ATORVASTATIN CALCIUM 20 MG/1
20 TABLET, FILM COATED ORAL DAILY
Qty: 90 TABLET | Refills: 3 | Status: CANCELLED | OUTPATIENT
Start: 2022-10-25

## 2022-10-25 RX ORDER — LISINOPRIL AND HYDROCHLOROTHIAZIDE 20; 12.5 MG/1; MG/1
2 TABLET ORAL DAILY
Qty: 180 TABLET | Refills: 3 | Status: SHIPPED | OUTPATIENT
Start: 2022-10-25

## 2022-10-25 RX ORDER — ATORVASTATIN CALCIUM 40 MG/1
40 TABLET, FILM COATED ORAL DAILY
Qty: 30 TABLET | Refills: 3 | Status: SHIPPED | OUTPATIENT
Start: 2022-10-25 | End: 2022-11-21

## 2022-10-25 ASSESSMENT — PATIENT HEALTH QUESTIONNAIRE - PHQ9
SUM OF ALL RESPONSES TO PHQ QUESTIONS 1-9: 0
2. FEELING DOWN, DEPRESSED OR HOPELESS: 0
SUM OF ALL RESPONSES TO PHQ QUESTIONS 1-9: 0
1. LITTLE INTEREST OR PLEASURE IN DOING THINGS: 0
SUM OF ALL RESPONSES TO PHQ9 QUESTIONS 1 & 2: 0

## 2022-10-25 NOTE — PROGRESS NOTES
[address] [facility]                                             [address] [facility], [address] [place] [address], [address] [place] [id number]        Phone: [phone]      Name:  Sidra Carlisle  :    1965    Consultants:   Patient Care Team:  SHUN Abdalla [address] [place] [address] as PCP - General (Family Medicine)    Chief Complaint:     Sidra Carlisle is a 56 y.o. male  who presents today for an established patient care visit with Personalized Prevention Plan Services as noted below. Chief Complaint   Patient presents with    Hypertension    Cholesterol Problem         HPI:     Sidra Carlisle 56 y.o. [place] ([facility]) male non-smoker with past medical history of hyperlipidemia and hypertension presents to the office to follow-up on his medical conditions. Hypertension  Patient takes lisinopril-hydrochlorothiazide 20-12.5 mg twice a day. Patient has no concerns or complaints. Patient said that blood pressure at home is runs between 120-130/80-90. Patient was wondering if he can stop hypertension medication. Hyperlipidemia  Last lipid panel on 2022 showed cholesterol level of 232, triglycerides level 236, , HDL 35. Currently, patient takes Lipitor 20 mg daily. Patient denies any side effects from the medication. From EMR, patient was advised to take Lipitor 40 mg daily however patient was only taking 20 mg daily. Patient said that he follows well-balanced diet, and he is vegetarian. Patient was wondering what order the side effects of statin therapy. Last hemoglobin a1c 5.4 on        Patient Active Problem List   Diagnosis    Mixed hyperlipidemia    Essential hypertension    Risk of myocardial infarction or stroke 7.5% or greater in next 10 years    Annual physical exam    Need for influenza vaccination    Overweight (BMI 25.0-29. 9)         Past Medical History:    Past Medical History:   Diagnosis Date    HTN (hypertension)     Hyperlipidemia     Risk of myocardial infarction or stroke 7.5% or greater in next 10 years 2/12/2021    Tooth infection        Past Surgical History:  Past Surgical History:   Procedure Laterality Date    COLONOSCOPY N/A 3/8/2022    COLONOSCOPY POLYPECTOMY SNARE/COLD BIOPSY performed by Jayshree Richard MD at [address]:  Prior to Visit Medications    Medication Sig Taking? Authorizing Provider   lisinopril-hydroCHLOROthiazide (PRINZIDE;ZESTORETIC) 20-12.5 MG per tablet Take 2 tablets by mouth daily Take one tablet in the morning, one tablet at night Yes C/ Canarias 9, DO   atorvastatin (LIPITOR) 40 MG tablet Take 1 tablet by mouth daily Yes C/ Canarias 9, DO       Allergies:    Patient has no known allergies.     Family History:       Problem Relation Age of Onset    No Known Problems Mother     No Known Problems Father     Heart Disease Other         2 brothers--CABG         Health Maintenance Completed:  Health Maintenance   Topic Date Due    Lipids  04/25/2023    Depression Screen  04/25/2023    DTaP/Tdap/Td vaccine (2 - Td or Tdap) 01/07/2026    Colorectal Cancer Screen  03/08/2027    Flu vaccine  Completed    Shingles vaccine  Completed    COVID-19 Vaccine  Completed    Hepatitis C screen  Completed    HIV screen  Completed    Hepatitis A vaccine  Aged Out    Hib vaccine  Aged Out    Meningococcal (ACWY) vaccine  Aged Out    Pneumococcal 0-64 years Vaccine  Aged [employer] History   Administered Date(s) Administered    COVID-19, MODERNA BLUE border, Primary or Immunocompromised, (age 12y+), IM, 100 mcg/0.5mL 03/27/2021, 04/24/2021, 12/08/2021    COVID-19, PFIZER PURPLE top, DILUTE for use, (age 15 y+), 30mcg/0.3mL 04/30/2021    COVID-19, [place] Vaccine, Vaccine Unspecified 04/14/2021    Influenza, FLUARIX, FLULAVAL, FLUZONE (age 10 mo+) AND AFLURIA, (age 1 y+), PF, 0.5mL 11/12/2020    Influenza, FLUCELVAX, (age 10 mo+), MDCK, PF, 0.5mL 10/25/2022    Td, unspecified formulation 01/23/2006    Tdap (Boostrix, Adacel) 01/07/2016    Zoster Recombinant (Shingrix) 08/17/2021, 04/25/2022         Review of Systems:  Constitutional: Negative for activity change, appetite change, chills, fatigue and fever. HENT: Negative for congestion, drooling, ear pain, hearing loss, sinus pressure, sore throat and trouble swallowing. Eyes: Negative for pain, discharge, redness, itching and visual disturbance. Respiratory: Negative for apnea, choking, chest tightness, shortness of breath and wheezing. Cardiovascular: Negative for chest pain, palpitations and leg swelling. Gastrointestinal: Negative for abdominal distention, abdominal pain, blood in stool, constipation, nausea and vomiting. Endocrine: Negative for cold intolerance, heat intolerance and polydipsia. Genitourinary: Negative for dysuria, flank pain, frequency, genital sores, hematuria. Musculoskeletal: Negative for back pain, gait problem, joint swelling, neck pain and neck stiffness. Skin: Negative for color change, rash and wound. Neurological: Negative for dizziness, syncope, light-headedness, numbness and headaches. Psychiatric/Behavioral: Negative for agitation, confusion, decreased concentration, dysphoric mood, hallucinations, sleep disturbance and suicidal ideas. The patient is not nervous/anxious and is not hyperactive. Physical Exam:   Vitals:    10/25/22 1234   BP: 128/82   Site: Left Upper Arm   Position: Sitting   Cuff Size: Medium Adult   Pulse: 79   Resp: 18   Temp: 97.4 °F (36.3 °C)   TempSrc: Infrared   SpO2: 99%   Weight: 165 lb 3.2 oz (74.9 kg)   Height: 5' 4\" (1.626 m)     Body mass index is 28.36 kg/m².     Wt Readings from Last 3 Encounters:   10/25/22 165 lb 3.2 oz (74.9 kg)   04/25/22 165 lb 3.2 oz (74.9 kg)   03/04/22 160 lb (72.6 kg)       BP Readings from Last 3 Encounters:   10/25/22 128/82   04/25/22 138/86   03/08/22 118/76     Constitutional:       General:  Not in acute distress. Appearance: Not ill-appearing, toxic-appearing or diaphoretic. HENT:      Head: Normocephalic and atraumatic. Right Ear: Tympanic membrane, ear canal and external ear normal. There is no impacted cerumen. Left Ear: Tympanic membrane, ear canal and external ear normal. There is no impacted cerumen. Nose: Nose normal. No congestion or rhinorrhea. Mouth/Throat:      Mouth: Mucous membranes are moist.      Pharynx: Oropharynx is clear. No oropharyngeal exudate or posterior oropharyngeal erythema. Eyes:      General: No scleral icterus. Right eye: No discharge. Left eye: No discharge. Extraocular Movements: Extraocular movements intact. Conjunctiva/sclera: Conjunctivae normal.      Pupils: Pupils are equal, round, and reactive to light. Cardiovascular:      Rate and Rhythm: Normal rate and regular rhythm. Pulses: Normal pulses. Heart sounds: Normal heart sounds. No murmur heard. No friction rub. No gallop. Pulmonary:      Effort: Pulmonary effort is normal. No respiratory distress. Breath sounds: No stridor. No wheezing, rhonchi or rales. Abdominal:      General: Abdomen is flat. Bowel sounds are normal. There is no distension. Palpations: Abdomen is soft. Tenderness: There is no abdominal tenderness. There is no right CVA tenderness, left CVA tenderness, guarding or rebound. Hernia: No hernia is present. Musculoskeletal:         General: No swelling, tenderness, deformity or signs of injury. Normal range of motion. Cervical back: Normal range of motion. No rigidity or tenderness. Left lower leg: No edema. Skin:     General: Skin is warm. Coloration: Skin is not jaundiced or pale. Findings: No bruising, erythema, lesion or rash. Neurological:      General: No focal deficit present. Mental Status: Alert and oriented to person, place, and time.       Cranial Nerves: No cranial nerve deficit. Sensory: No sensory deficit. Motor: No weakness. Gait: Gait normal.   Psychiatric:         Mood and Affect: Mood normal.         Behavior: Behavior normal.         Thought Content: Thought content normal.         Judgment: Judgment normal.         Lab Review:   No visits with results within 6 Month(s) from this visit. Latest known visit with results is:   Hospital Outpatient Visit on 04/25/2022   Component Date Value    HIV Ag/Ab 04/25/2022 Non-Reactive     HIV-1 Antibody 04/25/2022 Non-Reactive     HIV ANTIGEN 04/25/2022 Non-Reactive     HIV-2 Ab 04/25/2022 Non-Reactive     Cholesterol, Total 04/25/2022 232 (A)     Triglycerides 04/25/2022 236 (A)     HDL 04/25/2022 35 (A)     LDL Calculated 04/25/2022 150 (A)     VLDL Cholesterol Calcula* 04/25/2022 47     Hep C Ab Interp 04/25/2022 Non-reactive     Sodium 04/25/2022 141     Potassium 04/25/2022 4.4     Chloride 04/25/2022 102     CO2 04/25/2022 26     Anion Gap 04/25/2022 13     Glucose 04/25/2022 85     BUN 04/25/2022 12     Creatinine 04/25/2022 0.9     GFR Non- 04/25/2022 >60     GFR  04/25/2022 >60     Calcium 04/25/2022 9.3     Total Protein 04/25/2022 7.1     Albumin 04/25/2022 4.7     Albumin/Globulin Ratio 04/25/2022 2.0     Total Bilirubin 04/25/2022 0.6     Alkaline Phosphatase 04/25/2022 63     ALT 04/25/2022 17     AST 04/25/2022 19           Assessment/Plan:  Ericabrandon Florencebrianda 56 y.o. [place] ([facility]) male non-smoker with past medical history of hyperlipidemia and hypertension presents to the office to follow-up on his medical conditions. Mixed hyperlipidemia  - Not well controlled, not at goal.   - Last lipid panel on 4/25/2022 showed cholesterol level of 232, triglycerides level 236, , HDL 35.   - Adjusted atorvastatin lipitor to 40mg. - Educated patient about atorvastatin medication mechanism of action, indications, contraindications, side effects and when to stop the medication.   - Will repeat LIPID PANEL; Future in 3 months.  - Recommended patient to maintain healthy lifestyle  - Recommended to avoid refined carbohydrates, fast food or processed food  - Maintain a diet with lots of vegetables, fruits, and good source of protein   - Recommended 150 minutes of aerobic exercises with moderate intensity per week    Essential hypertension  - Well-controlled  - Recommended DASH diet  - Advised patient to continue lisinopril-hydroCHLOROthiazide (PRINZIDE;ZESTORETIC) 20-12.5 MG per tablet; Take 2 tablets by mouth daily Take one tablet in the morning, one tablet at night.   - Educated patient about importance of staying on blood pressure medications and having optimal BP control.    -   Ordered  Comprehensive Metabolic Panel; Future  -   Ordered  MICROALBUMIN / CREATININE URINE RATIO; Future  - Recommended patient to measure blood pressure at home, log it on paper  - Return to clinic in 3 months to follow-up. Overweight (BMI 25.0-29.9)  - not at goal  - Educated patient about risk of obesity  - set up goals to lose weight including:              - lose 1-2 lbs per week  -     Hemoglobin A1C; Future  - Recommended patient to maintain healthy lifestyle  - Recommended to avoid refined carbohydrates, fast food or processed food  - Maintain a diet with lots of vegetables, fruits, and good source of protein   - Recommended 150 minutes of aerobic exercises with moderate intensity per week  -Return to clinic in 3 months to follow-up. Need for influenza vaccination  -   Administered today in the office influenza, FLUCELVAX, (age 10 mo+), IM, Preservative Free, 0.5 mL  -VIS given      Health Maintenance Due:  There are no preventive care reminders to display for this patient. Health care decision maker:  <72years old    Health Maintenance: (USPSTF Recommendations)  Up-to-date on health maintenance. Immunizations:  Patient received flu vaccine today in the office.   RTC:  Return in about 3 months (around 1/25/2023) to follow up on medical conditions. Return in 2 months and 3 weeks to lab. EMR Dragon/transcription disclaimer:  Much of this encounter note is electronic transcription/translation of spoken language to printed texts. The electronic translation of spoken language may be erroneous, or at times, nonsensical words or phrases may be inadvertently transcribed. Although I have reviewed the note for such errors, some may still exist.     Patient care was discussed with Renee Valles MD. Thank you for your supervision.        J Carlos, [address] [place] [address]  10/25/2022

## 2022-10-25 NOTE — PATIENT INSTRUCTIONS
Return to lab in 2 months and 3 weeks to repeat blood workup. Return to office in 3 months. Return to clinic as needed.

## 2022-11-20 DIAGNOSIS — E78.2 MIXED HYPERLIPIDEMIA: ICD-10-CM

## 2022-11-21 RX ORDER — ATORVASTATIN CALCIUM 40 MG/1
TABLET, FILM COATED ORAL
Qty: 30 TABLET | Refills: 3 | Status: SHIPPED | OUTPATIENT
Start: 2022-11-21

## 2022-11-21 NOTE — TELEPHONE ENCOUNTER
Refill Request       Last Seen: Last Seen Department: 10/25/2022  Last Seen by PCP: 10/25/2022    Last Written: 10/25/2022    Next Appointment:   Future Appointments   Date Time Provider Josefa Mulligan   1/23/2023  4:00 PM Debbie Dejesus [facility]             Requested Prescriptions     Pending Prescriptions Disp Refills    atorvastatin (LIPITOR) 40 MG tablet [Pharmacy Med Name: ATORVASTATIN 40 MG TABLET] 30 tablet 3     Sig: TAKE 1 TABLET BY MOUTH EVERY DAY

## 2022-11-24 PROBLEM — Z00.00 ANNUAL PHYSICAL EXAM: Status: RESOLVED | Noted: 2022-10-25 | Resolved: 2022-11-24

## 2022-11-24 PROBLEM — Z23 NEED FOR INFLUENZA VACCINATION: Status: RESOLVED | Noted: 2022-10-25 | Resolved: 2022-11-24

## 2023-01-19 ENCOUNTER — HOSPITAL ENCOUNTER (OUTPATIENT)
Age: 58
Discharge: HOME | End: 2023-01-19
Payer: COMMERCIAL

## 2023-01-19 DIAGNOSIS — Z00.00 ANNUAL PHYSICAL EXAM: ICD-10-CM

## 2023-01-19 DIAGNOSIS — I10 ESSENTIAL HYPERTENSION: ICD-10-CM

## 2023-01-19 DIAGNOSIS — E78.2 MIXED HYPERLIPIDEMIA: ICD-10-CM

## 2023-01-19 DIAGNOSIS — E66.3 OVERWEIGHT (BMI 25.0-29.9): ICD-10-CM

## 2023-01-19 LAB
A/G RATIO: 1.6 (ref 1.1–2.2)
ALBUMIN SERPL-MCNC: 4.2 G/DL (ref 3.4–5)
ALP BLD-CCNC: 70 U/L (ref 40–129)
ALT SERPL-CCNC: 25 U/L (ref 10–40)
ANION GAP SERPL CALCULATED.3IONS-SCNC: 12 MMOL/L (ref 3–16)
AST SERPL-CCNC: 25 U/L (ref 15–37)
BASOPHILS ABSOLUTE: 0 K/UL (ref 0–0.2)
BASOPHILS RELATIVE PERCENT: 0.6 %
BILIRUB SERPL-MCNC: 0.7 MG/DL (ref 0–1)
BUN BLDV-MCNC: 12 MG/DL (ref 7–20)
CALCIUM SERPL-MCNC: 9.9 MG/DL (ref 8.3–10.6)
CHLORIDE BLD-SCNC: 96 MMOL/L (ref 99–110)
CHOLESTEROL, TOTAL: 156 MG/DL (ref 0–199)
CO2: 28 MMOL/L (ref 21–32)
CREAT SERPL-MCNC: 0.9 MG/DL (ref 0.9–1.3)
CREATININE URINE: 280 MG/DL (ref 39–259)
EOSINOPHILS ABSOLUTE: 0.1 K/UL (ref 0–0.6)
EOSINOPHILS RELATIVE PERCENT: 1.6 %
GFR SERPL CREATININE-BSD FRML MDRD: >60 ML/MIN/{1.73_M2}
GLUCOSE BLD-MCNC: 76 MG/DL (ref 70–99)
HCT VFR BLD CALC: 47.8 % (ref 40.5–52.5)
HDLC SERPL-MCNC: 32 MG/DL (ref 40–60)
HEMOGLOBIN: 16.2 G/DL (ref 13.5–17.5)
LDL CHOLESTEROL CALCULATED: 85 MG/DL
LYMPHOCYTES ABSOLUTE: 2.4 K/UL (ref 1–5.1)
LYMPHOCYTES RELATIVE PERCENT: 32.3 %
MCH RBC QN AUTO: 30.1 PG (ref 26–34)
MCHC RBC AUTO-ENTMCNC: 34 G/DL (ref 31–36)
MCV RBC AUTO: 88.5 FL (ref 80–100)
MICROALBUMIN UR-MCNC: 6.7 MG/DL
MICROALBUMIN/CREAT UR-RTO: 23.9 MG/G (ref 0–30)
MONOCYTES ABSOLUTE: 0.4 K/UL (ref 0–1.3)
MONOCYTES RELATIVE PERCENT: 5.4 %
NEUTROPHILS ABSOLUTE: 4.5 K/UL (ref 1.7–7.7)
NEUTROPHILS RELATIVE PERCENT: 60.1 %
PDW BLD-RTO: 14.1 % (ref 12.4–15.4)
PLATELET # BLD: 230 K/UL (ref 135–450)
PMV BLD AUTO: 8.6 FL (ref 5–10.5)
POTASSIUM SERPL-SCNC: 4.5 MMOL/L (ref 3.5–5.1)
RBC # BLD: 5.4 M/UL (ref 4.2–5.9)
SODIUM BLD-SCNC: 136 MMOL/L (ref 136–145)
TOTAL PROTEIN: 6.9 G/DL (ref 6.4–8.2)
TRIGL SERPL-MCNC: 197 MG/DL (ref 0–150)
VLDLC SERPL CALC-MCNC: 39 MG/DL
WBC # BLD: 7.4 K/UL (ref 4–11)

## 2023-01-19 PROCEDURE — 82043 UR ALBUMIN QUANTITATIVE: CPT

## 2023-01-19 PROCEDURE — 36415 COLL VENOUS BLD VENIPUNCTURE: CPT

## 2023-01-19 PROCEDURE — 83036 HEMOGLOBIN GLYCOSYLATED A1C: CPT

## 2023-01-19 PROCEDURE — 80061 LIPID PANEL: CPT

## 2023-01-19 PROCEDURE — 85025 COMPLETE CBC W/AUTO DIFF WBC: CPT

## 2023-01-19 PROCEDURE — 80053 COMPREHEN METABOLIC PANEL: CPT

## 2023-01-19 PROCEDURE — 82570 ASSAY OF URINE CREATININE: CPT

## 2023-01-20 LAB
ESTIMATED AVERAGE GLUCOSE: 108.3 MG/DL
HBA1C MFR BLD: 5.4 %

## 2023-01-20 NOTE — PROGRESS NOTES
[address] and [facility]                                             [facility], [address] [place] [address], [address] [place] [id number]        Phone: [phone]      Name:  Nabeel Mayberry  :    1965    Consultants:   Patient Care Team:  SHUN Abdalla [address] [place] [address] as PCP - General (Family Medicine)    Chief Complaint:     Nabeel Mayberry is a 56 y.o. male  who presents today for an established patient care visit with Personalized Prevention Plan Services as noted below. Chief Complaint   Patient presents with    Hypertension     Checks once or twice a week, 130-140/80's. Feels that the meds are working    Rash     On both arms, started a month ago       HPI:     Nabeel Mayberry 56 y.o. [place] ([facility]) male non-smoker with past medical history of hyperlipidemia and hypertension presents to the office to follow-up on his medical conditions, recent lab work-up with complaints of bilateral knee pain and rashes on bilateral forearms. Discussed with patient   - Hemoglobin A1c level 5.4, within normal range and no evidence of diabetes. - Liver and electrolytes unremarkable and within normal range. - Kidney level within normal limits, however showing kidneys leaking mild proteins and that could due to history of hypertension. Patient getting treatment for hypertension with lisinopril-hydrochlorothiazide, the lisinopril helps prevent kidney disease from progressing and reduces the amount of protein that goes unfiltered by the kidneys. Lipid panel showing improvement in cholesterol levels. Triglycerides still elevated. Blood count levels unremarkable, no evidence of anemia. Rashes on bilateral forearms on the extensor side. Patient mentioned that for the last month, he has been experiencing itchy rashes on bilateral forearms. Patient mentioned that occasionally causes itchiness.  Patient denies changing detergent, clothes types, denies any injuries, denies any fever, chills, insect bites, fever, chills. Patient mentioned that dry and cold weather makes it worse. Patient mentioned that his heat at home is central and gas heat. Patient denies turning on the fire pit. Patient mentioned that he has been using fernando cream and this has been helping minimally. Bilateral knee pain  Patient mentioned that he has been having bilateral knee pain on the superior border of the patella and medial border of the patella for the last year. Patient described the pain as achy. Rated the pain 3-4 out of 10. Resting makes the pain better. Pain is exacerbated when patient hikes, go up and down on stairs. Patient mentioned that pain when going down on stairs worse than going up. Patient denies any pain with walking. Patient has not tried taking any medications or ointment for that. Patient denies any injury, history of physical therapy, history of injections in the knees. Hypertension  Patient takes lisinopril-hydrochlorothiazide 20-12.5 mg twice a day. Patient mentioned that blood pressure at home is running around 120-130/80. Patient denies any cardiovascular symptoms. Patient has no concerns or complaints. Hyperlipidemia  Lipid panel showing improvement in cholesterol levels. Triglycerides still elevated on 1/19/2022. Currently, patient takes Lipitor 40 mg daily. Patient denies any side effects from the medication. Patient Active Problem List   Diagnosis    Mixed hyperlipidemia    Primary hypertension    Risk of myocardial infarction or stroke 7.5% or greater in next 10 years    Overweight (BMI 25.0-29. 9)    Acute pain of both knees    Patellofemoral pain syndrome of both knees    Tendinosis of quadriceps tendon    Dyshidrotic eczema         Past Medical History:    Past Medical History:   Diagnosis Date    HTN (hypertension)     Hyperlipidemia     Risk of myocardial infarction or stroke 7.5% or greater in next 10 years 2/12/2021    Tooth infection        Past Surgical History:  Past Surgical History:   Procedure Laterality Date    COLONOSCOPY N/A 3/8/2022    COLONOSCOPY POLYPECTOMY SNARE/COLD BIOPSY performed by Matilda Austin MD at [address]:  Prior to Visit Medications    Medication Sig Taking? Authorizing Provider   lisinopril-hydroCHLOROthiazide (PRINZIDE;ZESTORETIC) 20-12.5 MG per tablet Take 2 tablets by mouth daily Take one tablet in the morning, one tablet at night Yes [facility], DO   atorvastatin (LIPITOR) 40 MG tablet TAKE 1 TABLET BY MOUTH EVERY DAY Yes [facility], DO   triamcinolone (KENALOG) 0.1 % ointment Apply topically 2 times daily for 7 days Yes [facility],    diclofenac sodium (VOLTAREN) 1 % GEL Apply 4 g topically 4 times daily Yes [facility], DO       Allergies:    Patient has no known allergies.     Family History:       Problem Relation Age of Onset    No Known Problems Mother     No Known Problems Father     Heart Disease Other         2 brothers--CABG         Health Maintenance Completed:  Health Maintenance   Topic Date Due    Depression Screen  10/25/2023    Lipids  01/19/2024    DTaP/Tdap/Td vaccine (2 - Td or Tdap) 01/07/2026    Colorectal Cancer Screen  03/08/2027    Flu vaccine  Completed    Shingles vaccine  Completed    COVID-19 Vaccine  Completed    Hepatitis C screen  Completed    HIV screen  Completed    Hepatitis A vaccine  Aged Out    Hib vaccine  Aged Out    Meningococcal (ACWY) vaccine  Aged Out    Pneumococcal 0-64 years Vaccine  Aged [employer] History   Administered Date(s) Administered    COVID-19, MODERNA BLUE border, Primary or Immunocompromised, (age 12y+), IM, 100 mcg/0.5mL 03/27/2021, 04/24/2021, 12/08/2021    COVID-19, PFIZER PURPLE top, DILUTE for use, (age 15 y+), 30mcg/0.3mL 04/30/2021    COVID-19, [place] Vaccine, Vaccine Unspecified 04/14/2021    Influenza, FLUARIX, FLULAVAL, FLUZONE (age 10 mo+) AND AFLURIA, (age 1 y+), PF, 0.5mL 11/12/2020    Influenza, FLUCELVAX, (age 10 mo+), MDCK, PF, 0.5mL 10/25/2022    Td, unspecified formulation 01/23/2006    Tdap (Boostrix, Adacel) 01/07/2016    Zoster Recombinant (Shingrix) 08/17/2021, 04/25/2022         Review of Systems:  Constitutional: Negative for activity change, appetite change, chills, fatigue and fever. HENT: Negative for congestion, drooling, ear pain, hearing loss, sinus pressure, sore throat and trouble swallowing. Eyes: Negative for pain, discharge, redness, itching and visual disturbance. Respiratory: Negative for apnea, choking, chest tightness, shortness of breath and wheezing. Cardiovascular: Negative for chest pain, palpitations and leg swelling. Gastrointestinal: Negative for abdominal distention, abdominal pain, blood in stool, constipation, nausea and vomiting. Endocrine: Negative for cold intolerance, heat intolerance and polydipsia. Genitourinary: Negative for dysuria, flank pain, frequency, genital sores, hematuria. Musculoskeletal: Positive for bilateral knee pain. Negative for back pain, gait problem, joint swelling, neck pain and neck stiffness. Skin: Positive for rashes on bilateral forearms on extensor side. Negative for color change, and wound. Neurological: Negative for dizziness, syncope, light-headedness, numbness and headaches. Psychiatric/Behavioral: Negative for agitation, confusion, decreased concentration, dysphoric mood, hallucinations, sleep disturbance and suicidal ideas. The patient is not nervous/anxious and is not hyperactive. Physical Exam:   Vitals:    01/23/23 1607 01/23/23 1633   BP: 138/78 124/84   Site: Left Upper Arm    Position: Sitting    Cuff Size: Medium Adult    Pulse: 79    Resp: 16    Temp: 97.4 °F (36.3 °C)    TempSrc: Temporal    SpO2: 98%    Weight: 171 lb (77.6 kg)    Height: 5' 4\" (1.626 m)      Body mass index is 29.35 kg/m².     Wt Readings from Last 3 Encounters:   01/23/23 171 lb (77.6 kg) 10/25/22 165 lb 3.2 oz (74.9 kg)   04/25/22 165 lb 3.2 oz (74.9 kg)       BP Readings from Last 3 Encounters:   01/23/23 124/84   10/25/22 128/82   04/25/22 138/86     Constitutional:       General:  Not in acute distress. Appearance: Not ill-appearing, toxic-appearing or diaphoretic. HENT:      Head: Normocephalic and atraumatic. Right Ear: Tympanic membrane, ear canal and external ear normal. There is no impacted cerumen. Left Ear: Tympanic membrane, ear canal and external ear normal. There is no impacted cerumen. Nose: Nose normal. No congestion or rhinorrhea. Mouth/Throat:      Mouth: Mucous membranes are moist.      Pharynx: Oropharynx is clear. No oropharyngeal exudate or posterior oropharyngeal erythema. Eyes:      General: No scleral icterus. Right eye: No discharge. Left eye: No discharge. Extraocular Movements: Extraocular movements intact. Conjunctiva/sclera: Conjunctivae normal.      Pupils: Pupils are equal, round, and reactive to light. Cardiovascular:      Rate and Rhythm: Normal rate and regular rhythm. Pulses: Normal pulses. Heart sounds: Normal heart sounds. No murmur heard. No friction rub. No gallop. Pulmonary:      Effort: Pulmonary effort is normal. No respiratory distress. Breath sounds: No stridor. No wheezing, rhonchi or rales. Abdominal:      General: Abdomen is flat. Bowel sounds are normal. There is no distension. Palpations: Abdomen is soft. Tenderness: There is no abdominal tenderness. There is no right CVA tenderness, left CVA tenderness, guarding or rebound. Hernia: No hernia is present. Musculoskeletal:      Bilateral knee examination:  Inspection: No evidence of redness, swelling, rashes. Palpation: Mild tender to touch on bilateral superior border of kneecap. Range of motion intact. knee examination demonstrates no effusion.   There is no tenderness to palpation over the medial or lateral joint line. Mild tenderness on bilateral superior borders of patella. There is no discomfort over the patellar tendon. There is no palpable popliteal cyst.  Sensation is intact. Range of motion is normal.  There is mild patellofemoral crepitation on the left side. There is no instability to varus or valgus stress applied at 0, 30, 60, or 90° of flexion. Anterior, posterior drawer test negative. Lachman test negative. Denice test negative. Thessaly test negative. General: No swelling, tenderness, deformity or signs of injury. Normal range of motion. Cervical back: Normal range of motion. No rigidity or tenderness. Left lower leg: No edema. Skin:  Right forearm examination:  Mild dry skin on the mid forearm on the extensor side, nontender to touch. No evidence of erythema, redness, swelling, blisters, discharge. Left forearm examination :  Dry skin on mid forearm on the extensor side. Nontender to touch, no evidence of erythema, redness, swelling, blisters, discharge. General: Skin is warm. Coloration: Skin is not jaundiced or pale. Findings: No bruising, erythema, lesion or rash. Neurological:      General: No focal deficit present. Mental Status: Alert and oriented to person, place, and time. Cranial Nerves: No cranial nerve deficit. Sensory: No sensory deficit. Motor: No weakness. Gait: Gait normal.   Psychiatric:         Mood and Affect: Mood normal.         Behavior: Behavior normal.         Thought Content:  Thought content normal.         Judgment: Judgment normal.            Lab Review:   Hospital Outpatient Visit on 01/19/2023   Component Date Value    Cholesterol, Total 01/19/2023 156     Triglycerides 01/19/2023 197 (A)     HDL 01/19/2023 32 (A)     LDL Calculated 01/19/2023 85     VLDL Cholesterol Calcula* 01/19/2023 39     Sodium 01/19/2023 136     Potassium 01/19/2023 4.5     Chloride 01/19/2023 96 (A)     CO2 01/19/2023 28 Anion Gap 01/19/2023 12     Glucose 01/19/2023 76     BUN 01/19/2023 12     Creatinine 01/19/2023 0.9     Est, Glom Filt Rate 01/19/2023 >60     Calcium 01/19/2023 9.9     Total Protein 01/19/2023 6.9     Albumin 01/19/2023 4.2     Albumin/Globulin Ratio 01/19/2023 1.6     Total Bilirubin 01/19/2023 0.7     Alkaline Phosphatase 01/19/2023 70     ALT 01/19/2023 25     AST 01/19/2023 25     Microalbumin, Random Uri* 01/19/2023 6.70 (A)     Creatinine, Ur 01/19/2023 280.0 (A)     Microalbumin Creatinine * 01/19/2023 23.9     WBC 01/19/2023 7.4     RBC 01/19/2023 5.40     Hemoglobin 01/19/2023 16.2     Hematocrit 01/19/2023 47.8     MCV 01/19/2023 88.5     MCH 01/19/2023 30.1     MCHC 01/19/2023 34.0     RDW 01/19/2023 14.1     Platelets 10/22/5733 230     MPV 01/19/2023 8.6     Neutrophils % 01/19/2023 60.1     Lymphocytes % 01/19/2023 32.3     Monocytes % 01/19/2023 5.4     Eosinophils % 01/19/2023 1.6     Basophils % 01/19/2023 0.6     Neutrophils Absolute 01/19/2023 4.5     Lymphocytes Absolute 01/19/2023 2.4     Monocytes Absolute 01/19/2023 0.4     Eosinophils Absolute 01/19/2023 0.1     Basophils Absolute 01/19/2023 0.0     Hemoglobin A1C 01/19/2023 5.4     eAG 01/19/2023 108. 3           Assessment/Plan:  Michaela Galvan 56 y.o. [place] ([facility]) male non-smoker with past medical history of hyperlipidemia and hypertension presents to the office to follow-up on his medical conditions, recent lab work-up with complaints of bilateral knee pain and rashes on bilateral forearms.      Mixed hyperlipidemia  -Well-controlled   -Last lipid panel on 1/19/2023 showed improvement in cholesterol levels, triglycerides slightly elevated, this could be due to nonfasting blood work-up.  -Continue atorvastatin lipitor to 40mg.   - Recommended patient to maintain healthy lifestyle  - Recommended to avoid refined carbohydrates, fast food or processed food  - Maintain a diet with lots of vegetables, fruits, and good source of protein   - Recommended 150 minutes of aerobic exercises with moderate intensity per week  -Return to clinic in 3 months for follow-up. Essential hypertension  - Well-controlled  - Recommended DASH diet  -  continue lisinopril-hydroCHLOROthiazide (PRINZIDE;ZESTORETIC) 20-12.5 MG per tablet; Take 2 tablets by mouth daily Take one tablet in the morning, one tablet at night.   -Last microalbumin creatinine ratio on 1/19/2023 showed kidney leaking mild proteins and that could due to history of hypertension. Patient getting treatment for hypertension with lisinopril-hydrochlorothiazide, the lisinopril helps prevent kidney disease from progressing and reduces the amount of protein that goes unfiltered by the kidneys.  - Recommended patient to measure blood pressure at home, log it on paper and bring it back to office when he returns.  - Return to clinic in 3 months to follow-up. Bilateral knee pain  -Most likely due to patellofemoral syndrome as patient have more pain when he descends stairs however differential diagnosis include quadriceps tendinosis versus versus knee osteoarthritis versus less likely patellar tendinitis versus other musculoskeletal etiologies causing similar symptoms.  -No evidence of tears in ACL, PCL, meniscal ligaments, or meniscus per physical exam.  -Educated patient about etiology of symptoms.  -Referred patient to PT to strengthen muscles, improve mobility, stretch muscles. -Advised patient to take 1000 mg of Tylenol every 8 hours as needed.  -Ordered Voltaren gel for patient to apply on area as needed. -Did not recommend NSAIDs at that time, because of history of hypertension and microalbuminuria.    -Did not order bilateral knee x-rays because it will not affect the management at this time.  -If patient continues to have pain, will consider ordering bilateral knee x-rays.  -Recommended patient to ice bilateral knee after excessive physical activity.  -     [facility] - [facility]  -     diclofenac sodium (VOLTAREN) 1 % GEL; Apply 4 g topically 4 times daily  -Return to clinic in 6 weeks for follow-up. Dyshidrotic eczema on bilateral forearms.  -Educated patient about etiology of symptoms, patient does report having central gas heater at home, denies use of fire pit. -Recommended patient to moisturize daily to help restore the skin barrier with Aquaphor. Patient voices understanding.  -Recommended patient to [place] Roxo or Shower every day. Use warm water, not hot, and do not stay in the water longer than 10-15 minutes. Use mild soap (ex. Dove or Cetaphil Cleanser) only on dirty areas and gently pat skin dry.  -Moisturize from affected area, with a thick cream or ointment (ex. cerave, cetaphil restoraderm, eucerin eczema care, plain vaseline and aquaphor)  -Advised patient to avoid cleansers, lotions, laundry detergent and other products containing fragrance. Look for products which are \"hypoallergenic,\" \"for sensitive skin,\" \"unscented,\" and \"fragrance free. \"  -Advised patient to use triamcinolone 0.1 ointment as needed, if itchiness got worse. -Return to office in 6 weeks to follow-up. Health Maintenance Due:  There are no preventive care reminders to display for this patient. Health care decision maker:  <72years old  Vot-ER:  asked and patient is already registered to vote      Health Maintenance: (USPSTF Recommendations)  Up-to-date on health maintenance. Immunizations:  Up to date. RTC:  Return in about 6 weeks (around 3/6/2023) to follow up on bilateral knee pain and bilateral forearm rash. EMR Dragon/transcription disclaimer:  Much of this encounter note is electronic transcription/translation of spoken language to printed texts. The electronic translation of spoken language may be erroneous, or at times, nonsensical words or phrases may be inadvertently transcribed.   Although I have reviewed the note for such errors, some may still exist.     Patient care was discussed with Dr. Justina Hayden . Thank you for your supervision.        J Carlos [place]  1/23/2023

## 2023-01-23 ENCOUNTER — OFFICE VISIT (OUTPATIENT)
Dept: PRIMARY CARE CLINIC | Age: 58
End: 2023-01-23
Payer: COMMERCIAL

## 2023-01-23 VITALS
RESPIRATION RATE: 16 BRPM | HEIGHT: 64 IN | HEART RATE: 79 BPM | TEMPERATURE: 97.4 F | BODY MASS INDEX: 29.19 KG/M2 | DIASTOLIC BLOOD PRESSURE: 84 MMHG | WEIGHT: 171 LBS | SYSTOLIC BLOOD PRESSURE: 124 MMHG | OXYGEN SATURATION: 98 %

## 2023-01-23 DIAGNOSIS — E78.2 MIXED HYPERLIPIDEMIA: ICD-10-CM

## 2023-01-23 DIAGNOSIS — M22.2X2 PATELLOFEMORAL PAIN SYNDROME OF BOTH KNEES: ICD-10-CM

## 2023-01-23 DIAGNOSIS — I10 ESSENTIAL HYPERTENSION: ICD-10-CM

## 2023-01-23 DIAGNOSIS — M25.561 ACUTE PAIN OF BOTH KNEES: Primary | ICD-10-CM

## 2023-01-23 DIAGNOSIS — M25.562 ACUTE PAIN OF BOTH KNEES: Primary | ICD-10-CM

## 2023-01-23 DIAGNOSIS — I10 PRIMARY HYPERTENSION: ICD-10-CM

## 2023-01-23 DIAGNOSIS — M22.2X1 PATELLOFEMORAL PAIN SYNDROME OF BOTH KNEES: ICD-10-CM

## 2023-01-23 DIAGNOSIS — L30.1 DYSHIDROTIC ECZEMA: ICD-10-CM

## 2023-01-23 DIAGNOSIS — M76.899 TENDINOSIS OF QUADRICEPS TENDON: ICD-10-CM

## 2023-01-23 PROCEDURE — 99214 OFFICE O/P EST MOD 30 MIN: CPT

## 2023-01-23 PROCEDURE — 3078F DIAST BP <80 MM HG: CPT

## 2023-01-23 PROCEDURE — 3074F SYST BP LT 130 MM HG: CPT

## 2023-01-23 RX ORDER — ATORVASTATIN CALCIUM 40 MG/1
TABLET, FILM COATED ORAL
Qty: 30 TABLET | Refills: 3 | Status: SHIPPED | OUTPATIENT
Start: 2023-01-23

## 2023-01-23 RX ORDER — LISINOPRIL AND HYDROCHLOROTHIAZIDE 20; 12.5 MG/1; MG/1
2 TABLET ORAL DAILY
Qty: 180 TABLET | Refills: 3 | Status: SHIPPED | OUTPATIENT
Start: 2023-01-23

## 2023-01-23 ASSESSMENT — PATIENT HEALTH QUESTIONNAIRE - PHQ9
3. TROUBLE FALLING OR STAYING ASLEEP: 0
7. TROUBLE CONCENTRATING ON THINGS, SUCH AS READING THE NEWSPAPER OR WATCHING TELEVISION: 0
5. POOR APPETITE OR OVEREATING: 0
6. FEELING BAD ABOUT YOURSELF - OR THAT YOU ARE A FAILURE OR HAVE LET YOURSELF OR YOUR FAMILY DOWN: 0
SUM OF ALL RESPONSES TO PHQ9 QUESTIONS 1 & 2: 0
9. THOUGHTS THAT YOU WOULD BE BETTER OFF DEAD, OR OF HURTING YOURSELF: 0
2. FEELING DOWN, DEPRESSED OR HOPELESS: 0
1. LITTLE INTEREST OR PLEASURE IN DOING THINGS: 0
8. MOVING OR SPEAKING SO SLOWLY THAT OTHER PEOPLE COULD HAVE NOTICED. OR THE OPPOSITE, BEING SO FIGETY OR RESTLESS THAT YOU HAVE BEEN MOVING AROUND A LOT MORE THAN USUAL: 0
SUM OF ALL RESPONSES TO PHQ QUESTIONS 1-9: 0
SUM OF ALL RESPONSES TO PHQ QUESTIONS 1-9: 0
10. IF YOU CHECKED OFF ANY PROBLEMS, HOW DIFFICULT HAVE THESE PROBLEMS MADE IT FOR YOU TO DO YOUR WORK, TAKE CARE OF THINGS AT HOME, OR GET ALONG WITH OTHER PEOPLE: 0
4. FEELING TIRED OR HAVING LITTLE ENERGY: 0
SUM OF ALL RESPONSES TO PHQ QUESTIONS 1-9: 0
SUM OF ALL RESPONSES TO PHQ QUESTIONS 1-9: 0

## 2023-01-23 ASSESSMENT — ANXIETY QUESTIONNAIRES
2. NOT BEING ABLE TO STOP OR CONTROL WORRYING: 0
1. FEELING NERVOUS, ANXIOUS, OR ON EDGE: 0
5. BEING SO RESTLESS THAT IT IS HARD TO SIT STILL: 0
4. TROUBLE RELAXING: 0
3. WORRYING TOO MUCH ABOUT DIFFERENT THINGS: 0
GAD7 TOTAL SCORE: 0
IF YOU CHECKED OFF ANY PROBLEMS ON THIS QUESTIONNAIRE, HOW DIFFICULT HAVE THESE PROBLEMS MADE IT FOR YOU TO DO YOUR WORK, TAKE CARE OF THINGS AT HOME, OR GET ALONG WITH OTHER PEOPLE: NOT DIFFICULT AT ALL
7. FEELING AFRAID AS IF SOMETHING AWFUL MIGHT HAPPEN: 0
6. BECOMING EASILY ANNOYED OR IRRITABLE: 0

## 2023-01-23 NOTE — PATIENT INSTRUCTIONS
-Bath or Shower every day. Use warm water, not hot, and do not stay in the water longer than 10-15 minutes. Use mild soap (ex. Dove or Cetaphil Cleanser) only on dirty areas and gently pat skin dry.  -Moisturize area, including after bathing, with a thick cream or ointment (ex. cerave, cetaphil restoraderm, eucerin eczema care, plain vaseline and aquaphor)  -Avoid cleansers, lotions, laundry detergent and other products containing fragrance. Look for products which are \"hypoallergenic,\" \"for sensitive skin,\" \"unscented,\" and \"fragrance free. \"  - Use  triamcinolone 0.1% as needed. -Then put on a pair of damp pajamas or wrap worst areas with damp gauze  -Cover with dry pajamas before going to sleep  -Remove wet pajamas or wraps and cover with vaseline in the morning      Follow up with PT. Use tylenol 1000mg as needed for knee pain. Use voltaren gel as needed. Ice bilateral knees if needed after exercise.

## 2023-01-27 ENCOUNTER — HOSPITAL ENCOUNTER (OUTPATIENT)
Dept: PHYSICAL THERAPY | Age: 58
Setting detail: THERAPIES SERIES
Discharge: HOME | End: 2023-01-27

## 2023-03-26 DIAGNOSIS — M25.561 ACUTE PAIN OF BOTH KNEES: ICD-10-CM

## 2023-03-26 DIAGNOSIS — M76.899 TENDINOSIS OF QUADRICEPS TENDON: ICD-10-CM

## 2023-03-26 DIAGNOSIS — M25.562 ACUTE PAIN OF BOTH KNEES: ICD-10-CM

## 2023-03-27 NOTE — TELEPHONE ENCOUNTER
Refill Request       Last Seen: Last Seen Department: 1/23/2023  Last Seen by PCP: 1/23/2023    Last Written: 1/23/23    Next Appointment:   No future appointments.           Requested Prescriptions     Pending Prescriptions Disp Refills    diclofenac sodium (VOLTAREN) 1 % GEL [Pharmacy Med Name: DICLOFENAC SODIUM 1% GEL] 400 g 1     Sig: APPLY 4 G TOPICALLY 4 TIMES A DAY patient/family

## 2023-07-16 DIAGNOSIS — E78.2 MIXED HYPERLIPIDEMIA: ICD-10-CM

## 2023-07-17 RX ORDER — ATORVASTATIN CALCIUM 40 MG/1
TABLET, FILM COATED ORAL
Qty: 90 TABLET | Refills: 1 | Status: SHIPPED | OUTPATIENT
Start: 2023-07-17

## 2023-07-17 NOTE — TELEPHONE ENCOUNTER
Refill Request   Return in about 6 weeks (around 3/6/2023). Last Seen: Last Seen Department: 1/23/2023  Last Seen by PCP: 1/23/2023    Last Written: 1/23/2023 #30 with 3    Next Appointment:   No future appointments. Message to [employer] to schedule appointment.          Requested Prescriptions     Pending Prescriptions Disp Refills    atorvastatin (LIPITOR) 40 MG tablet [Pharmacy Med Name: ATORVASTATIN 40 MG TABLET] 90 tablet 1     Sig: TAKE 1 TABLET BY MOUTH EVERY DAY

## 2023-07-28 ENCOUNTER — OFFICE VISIT (OUTPATIENT)
Dept: PRIMARY CARE CLINIC | Age: 58
End: 2023-07-28

## 2023-07-28 VITALS
TEMPERATURE: 97.2 F | WEIGHT: 166.2 LBS | HEIGHT: 64 IN | RESPIRATION RATE: 16 BRPM | BODY MASS INDEX: 28.38 KG/M2 | DIASTOLIC BLOOD PRESSURE: 80 MMHG | HEART RATE: 80 BPM | SYSTOLIC BLOOD PRESSURE: 130 MMHG | OXYGEN SATURATION: 97 %

## 2023-07-28 DIAGNOSIS — M22.2X2 PATELLOFEMORAL PAIN SYNDROME OF BOTH KNEES: ICD-10-CM

## 2023-07-28 DIAGNOSIS — Z13.1 DIABETES MELLITUS SCREENING: ICD-10-CM

## 2023-07-28 DIAGNOSIS — E78.2 MIXED HYPERLIPIDEMIA: ICD-10-CM

## 2023-07-28 DIAGNOSIS — I10 PRIMARY HYPERTENSION: Primary | ICD-10-CM

## 2023-07-28 DIAGNOSIS — L30.1 DYSHIDROTIC ECZEMA: ICD-10-CM

## 2023-07-28 DIAGNOSIS — M22.2X1 PATELLOFEMORAL PAIN SYNDROME OF BOTH KNEES: ICD-10-CM

## 2023-07-28 DIAGNOSIS — M76.899 TENDINOSIS OF QUADRICEPS TENDON: ICD-10-CM

## 2023-07-28 PROBLEM — M25.562 ACUTE PAIN OF BOTH KNEES: Status: RESOLVED | Noted: 2023-01-23 | Resolved: 2023-07-28

## 2023-07-28 PROBLEM — M25.561 ACUTE PAIN OF BOTH KNEES: Status: RESOLVED | Noted: 2023-01-23 | Resolved: 2023-07-28

## 2023-07-28 ASSESSMENT — PATIENT HEALTH QUESTIONNAIRE - PHQ9
1. LITTLE INTEREST OR PLEASURE IN DOING THINGS: 0
8. MOVING OR SPEAKING SO SLOWLY THAT OTHER PEOPLE COULD HAVE NOTICED. OR THE OPPOSITE, BEING SO FIGETY OR RESTLESS THAT YOU HAVE BEEN MOVING AROUND A LOT MORE THAN USUAL: 0
SUM OF ALL RESPONSES TO PHQ QUESTIONS 1-9: 0
2. FEELING DOWN, DEPRESSED OR HOPELESS: 0
10. IF YOU CHECKED OFF ANY PROBLEMS, HOW DIFFICULT HAVE THESE PROBLEMS MADE IT FOR YOU TO DO YOUR WORK, TAKE CARE OF THINGS AT HOME, OR GET ALONG WITH OTHER PEOPLE: 0
4. FEELING TIRED OR HAVING LITTLE ENERGY: 0
6. FEELING BAD ABOUT YOURSELF - OR THAT YOU ARE A FAILURE OR HAVE LET YOURSELF OR YOUR FAMILY DOWN: 0
SUM OF ALL RESPONSES TO PHQ9 QUESTIONS 1 & 2: 0
9. THOUGHTS THAT YOU WOULD BE BETTER OFF DEAD, OR OF HURTING YOURSELF: 0
3. TROUBLE FALLING OR STAYING ASLEEP: 0
SUM OF ALL RESPONSES TO PHQ QUESTIONS 1-9: 0
7. TROUBLE CONCENTRATING ON THINGS, SUCH AS READING THE NEWSPAPER OR WATCHING TELEVISION: 0
SUM OF ALL RESPONSES TO PHQ QUESTIONS 1-9: 0
SUM OF ALL RESPONSES TO PHQ QUESTIONS 1-9: 0
5. POOR APPETITE OR OVEREATING: 0

## 2023-07-28 ASSESSMENT — ANXIETY QUESTIONNAIRES
6. BECOMING EASILY ANNOYED OR IRRITABLE: 0
3. WORRYING TOO MUCH ABOUT DIFFERENT THINGS: 0
1. FEELING NERVOUS, ANXIOUS, OR ON EDGE: 0
GAD7 TOTAL SCORE: 0
IF YOU CHECKED OFF ANY PROBLEMS ON THIS QUESTIONNAIRE, HOW DIFFICULT HAVE THESE PROBLEMS MADE IT FOR YOU TO DO YOUR WORK, TAKE CARE OF THINGS AT HOME, OR GET ALONG WITH OTHER PEOPLE: NOT DIFFICULT AT ALL
4. TROUBLE RELAXING: 0
2. NOT BEING ABLE TO STOP OR CONTROL WORRYING: 0
7. FEELING AFRAID AS IF SOMETHING AWFUL MIGHT HAPPEN: 0
5. BEING SO RESTLESS THAT IT IS HARD TO SIT STILL: 0

## 2024-01-12 DIAGNOSIS — E78.2 MIXED HYPERLIPIDEMIA: ICD-10-CM

## 2024-01-12 RX ORDER — ATORVASTATIN CALCIUM 40 MG/1
TABLET, FILM COATED ORAL
Qty: 90 TABLET | Refills: 1 | Status: SHIPPED | OUTPATIENT
Start: 2024-01-12

## 2024-01-12 NOTE — TELEPHONE ENCOUNTER
Refill Request       Last Seen: Last Seen Department: 7/28/2023  Last Seen by PCP: 7/28/2023    Last Written: 07/17/23 qty 90 w/ 1    Next Appointment:   Future Appointments   Date Time Provider Department Center   2/2/2024  1:00 PM Julian Esquivel DO [facility]       Future appointment scheduled      Requested Prescriptions     Pending Prescriptions Disp Refills    atorvastatin (LIPITOR) 40 MG tablet [Pharmacy Med Name: ATORVASTATIN 40 MG TABLET] 90 tablet 1     Sig: TAKE 1 TABLET BY MOUTH EVERY DAY

## 2024-02-21 ENCOUNTER — HOSPITAL ENCOUNTER (OUTPATIENT)
Age: 59
Discharge: HOME | End: 2024-02-21
Payer: COMMERCIAL

## 2024-02-21 DIAGNOSIS — E78.2 MIXED HYPERLIPIDEMIA: ICD-10-CM

## 2024-02-21 DIAGNOSIS — I10 PRIMARY HYPERTENSION: ICD-10-CM

## 2024-02-21 LAB
ALBUMIN SERPL-MCNC: 4.2 G/DL (ref 3.4–5)
ALBUMIN/GLOB SERPL: 1.4 {RATIO} (ref 1.1–2.2)
ALP SERPL-CCNC: 68 U/L (ref 40–129)
ALT SERPL-CCNC: 25 U/L (ref 10–40)
ANION GAP SERPL CALCULATED.3IONS-SCNC: 6 MMOL/L (ref 3–16)
AST SERPL-CCNC: 19 U/L (ref 15–37)
BILIRUB SERPL-MCNC: 0.8 MG/DL (ref 0–1)
BUN SERPL-MCNC: 8 MG/DL (ref 7–20)
CALCIUM SERPL-MCNC: 9.6 MG/DL (ref 8.3–10.6)
CHLORIDE SERPL-SCNC: 99 MMOL/L (ref 99–110)
CHOLEST SERPL-MCNC: 106 MG/DL (ref 0–199)
CO2 SERPL-SCNC: 31 MMOL/L (ref 21–32)
CREAT SERPL-MCNC: 0.8 MG/DL (ref 0.9–1.3)
CREAT UR-MCNC: 114.9 MG/DL (ref 39–259)
EST. AVERAGE GLUCOSE BLD GHB EST-MCNC: 111.2 MG/DL
GFR SERPLBLD CREATININE-BSD FMLA CKD-EPI: >60 ML/MIN/{1.73_M2}
GLUCOSE SERPL-MCNC: 89 MG/DL (ref 70–99)
HBA1C MFR BLD: 5.5 %
HDLC SERPL-MCNC: 37 MG/DL (ref 40–60)
LDLC SERPL CALC-MCNC: 50 MG/DL
MICROALBUMIN UR DL<=1MG/L-MCNC: <1.2 MG/DL
MICROALBUMIN/CREAT UR: NORMAL MG/G (ref 0–30)
POTASSIUM SERPL-SCNC: 4.1 MMOL/L (ref 3.5–5.1)
PROT SERPL-MCNC: 7.3 G/DL (ref 6.4–8.2)
SODIUM SERPL-SCNC: 136 MMOL/L (ref 136–145)
TRIGL SERPL-MCNC: 95 MG/DL (ref 0–150)
VLDLC SERPL CALC-MCNC: 19 MG/DL

## 2024-02-21 PROCEDURE — 80061 LIPID PANEL: CPT

## 2024-02-21 PROCEDURE — 82043 UR ALBUMIN QUANTITATIVE: CPT

## 2024-02-21 PROCEDURE — 36415 COLL VENOUS BLD VENIPUNCTURE: CPT

## 2024-02-21 PROCEDURE — 82570 ASSAY OF URINE CREATININE: CPT

## 2024-02-21 PROCEDURE — 83036 HEMOGLOBIN GLYCOSYLATED A1C: CPT

## 2024-02-21 PROCEDURE — 80053 COMPREHEN METABOLIC PANEL: CPT

## 2024-02-22 NOTE — PROGRESS NOTES
PROGRESS NOTE   [facility]                                  [address], [address], [facility] [address]         Phone: [phone]    Date of Service:  2/23/2024     Patient ID: .Eamon Gray is a 58 y.o. male      Subjective:     CC:   Chief Complaint   Patient presents with    Annual Exam     No pressing issues at this time          HPI  Eamon Gray 58 y.o.  male non-smoker with past medical history of hyperlipidemia and hypertension presents to the office to follow-up on his medical conditions and for annual physical exam.     Discussed the following lab workup from 2/21/2024.   Lipid panel showed significant improvement in cholesterol level. Congratulations!  HDL(good cholesterol) slightly decreased. See below recommendations.  Kidney, electrolytes and liver levels within normal limits.  Hemoglobin A1c 5.5, within normal limits, no evidence of diabetes.  Microalbumin creatinine levels within normal limits.     Mixed hyperlipidemia  -Last lipid panel on 2/21/2024 showed LDL  50, HDL 37, triglycerides 95, total cholesterol 106.   - Patient takes atorvastatin lipitor 40mg.   -Patient has no concerns or complaints.   - Patient reported maintaining healthy lifestyle.    Essential hypertension  - Blood pressure well controlled in the office.   - Patient takes lisinopril-hydroCHLOROthiazide (PRINZIDE;ZESTORETIC) 20-12.5 MG per tablet; patient takes 2 tablets daily.    -Last microalbumin creatinine ratio on 2/21/2024.  - Patient denies any cardiovascular symptoms or other review of systems symptoms.      History of bilateral knee pain suspected due to patellofemoral pain syndrome  - Resolved.   - he has no concerns or complaints.      Dyshidrotic eczema on bilateral forearms.  - has resolved.   - Uses Aquaphor as needed.   - patient has no concerns or complaints.      Patient reported that he [place]

## 2024-02-23 ENCOUNTER — OFFICE VISIT (OUTPATIENT)
Dept: PRIMARY CARE CLINIC | Age: 59
End: 2024-02-23

## 2024-02-23 VITALS
DIASTOLIC BLOOD PRESSURE: 66 MMHG | HEIGHT: 64 IN | WEIGHT: 152 LBS | SYSTOLIC BLOOD PRESSURE: 128 MMHG | OXYGEN SATURATION: 97 % | BODY MASS INDEX: 25.95 KG/M2 | HEART RATE: 70 BPM

## 2024-02-23 DIAGNOSIS — M22.2X1 PATELLOFEMORAL PAIN SYNDROME OF BOTH KNEES: ICD-10-CM

## 2024-02-23 DIAGNOSIS — Z23 NEED FOR VACCINATION: ICD-10-CM

## 2024-02-23 DIAGNOSIS — E78.2 MIXED HYPERLIPIDEMIA: ICD-10-CM

## 2024-02-23 DIAGNOSIS — I10 PRIMARY HYPERTENSION: Primary | ICD-10-CM

## 2024-02-23 DIAGNOSIS — L30.1 DYSHIDROTIC ECZEMA: ICD-10-CM

## 2024-02-23 DIAGNOSIS — M22.2X2 PATELLOFEMORAL PAIN SYNDROME OF BOTH KNEES: ICD-10-CM

## 2024-02-23 DIAGNOSIS — Z00.00 ANNUAL PHYSICAL EXAM: ICD-10-CM

## 2024-02-23 SDOH — ECONOMIC STABILITY: FOOD INSECURITY: WITHIN THE PAST 12 MONTHS, THE FOOD YOU BOUGHT JUST DIDN'T LAST AND YOU DIDN'T HAVE MONEY TO GET MORE.: NEVER TRUE

## 2024-02-23 SDOH — ECONOMIC STABILITY: FOOD INSECURITY: WITHIN THE PAST 12 MONTHS, YOU WORRIED THAT YOUR FOOD WOULD RUN OUT BEFORE YOU GOT MONEY TO BUY MORE.: NEVER TRUE

## 2024-02-23 SDOH — ECONOMIC STABILITY: HOUSING INSECURITY
IN THE LAST 12 MONTHS, WAS THERE A TIME WHEN YOU DID NOT HAVE A STEADY PLACE TO SLEEP OR SLEPT IN A SHELTER (INCLUDING NOW)?: NO

## 2024-02-23 SDOH — ECONOMIC STABILITY: INCOME INSECURITY: HOW HARD IS IT FOR YOU TO PAY FOR THE VERY BASICS LIKE FOOD, HOUSING, MEDICAL CARE, AND HEATING?: NOT HARD AT ALL

## 2024-02-23 ASSESSMENT — PATIENT HEALTH QUESTIONNAIRE - PHQ9
SUM OF ALL RESPONSES TO PHQ9 QUESTIONS 1 & 2: 0
SUM OF ALL RESPONSES TO PHQ QUESTIONS 1-9: 0
SUM OF ALL RESPONSES TO PHQ QUESTIONS 1-9: 0
1. LITTLE INTEREST OR PLEASURE IN DOING THINGS: 0
SUM OF ALL RESPONSES TO PHQ QUESTIONS 1-9: 0
SUM OF ALL RESPONSES TO PHQ QUESTIONS 1-9: 0
2. FEELING DOWN, DEPRESSED OR HOPELESS: 0

## 2024-04-10 DIAGNOSIS — I10 ESSENTIAL HYPERTENSION: ICD-10-CM

## 2024-04-10 DIAGNOSIS — I10 PRIMARY HYPERTENSION: ICD-10-CM

## 2024-04-10 RX ORDER — LISINOPRIL AND HYDROCHLOROTHIAZIDE 20; 12.5 MG/1; MG/1
2 TABLET ORAL DAILY
Qty: 180 TABLET | Refills: 3 | Status: SHIPPED | OUTPATIENT
Start: 2024-04-10

## 2024-04-10 NOTE — TELEPHONE ENCOUNTER
Refill Request       Last Seen: Last Seen Department: 2/23/2024  Last Seen by PCP: 2/23/2024    Last Written: 1/23/23 180 with 3    Next Appointment:   No future appointments.        Requested Prescriptions     Pending Prescriptions Disp Refills    lisinopril-hydroCHLOROthiazide (PRINZIDE;ZESTORETIC) 20-12.5 MG per tablet [Pharmacy Med Name: LISINOPRIL-HCTZ 20-12.5 MG TAB] 180 tablet 3     Sig: TAKE 2 TABLETS BY MOUTH DAILY TAKE ONE TABLET IN THE MORNING, ONE TABLET AT NIGHT

## 2024-04-26 DIAGNOSIS — E78.2 MIXED HYPERLIPIDEMIA: ICD-10-CM

## 2024-04-29 RX ORDER — ATORVASTATIN CALCIUM 40 MG/1
TABLET, FILM COATED ORAL
Qty: 90 TABLET | Refills: 1 | Status: SHIPPED | OUTPATIENT
Start: 2024-04-29

## 2024-04-29 NOTE — TELEPHONE ENCOUNTER
Refill Request       Last Seen: Last Seen Department: 2/23/2024  Last Seen by PCP: 2/23/2024    Last Written: 1/12/24 90 with 1    Next Appointment:   No future appointments.        Requested Prescriptions     Pending Prescriptions Disp Refills    atorvastatin (LIPITOR) 40 MG tablet [Pharmacy Med Name: ATORVASTATIN 40 MG TABLET] 90 tablet 1     Sig: TAKE 1 TABLET BY MOUTH EVERY DAY

## 2024-05-08 ENCOUNTER — HOSPITAL ENCOUNTER (OUTPATIENT)
Age: 59
Discharge: HOME | End: 2024-05-08
Payer: COMMERCIAL

## 2024-05-08 DIAGNOSIS — Z23 NEED FOR VACCINATION: ICD-10-CM

## 2024-05-08 LAB
HBV SURFACE AB SERPL IA-ACNC: <3.5 MIU/ML
MEV IGG SER QL IA: NORMAL
MUV IGG SER QL IA: NORMAL
RUBV IGG SERPL QL IA: NORMAL
VZV IGG SER QL IA: NORMAL

## 2024-05-08 PROCEDURE — 86735 MUMPS ANTIBODY: CPT

## 2024-05-08 PROCEDURE — 86762 RUBELLA ANTIBODY: CPT

## 2024-05-08 PROCEDURE — 86787 VARICELLA-ZOSTER ANTIBODY: CPT

## 2024-05-08 PROCEDURE — 86706 HEP B SURFACE ANTIBODY: CPT

## 2024-05-08 PROCEDURE — 36415 COLL VENOUS BLD VENIPUNCTURE: CPT

## 2024-05-08 PROCEDURE — 86765 RUBEOLA ANTIBODY: CPT

## (undated) DEVICE — FORMALIN  10%NBF 20ML PREFLL CONT

## (undated) DEVICE — FORCEPS BX L240CM JAW DIA2.8MM L CAP W/ NDL MIC MESH TOOTH

## (undated) DEVICE — ELECTRODE,ECG,STRESS,FOAM,3PK: Brand: MEDLINE

## (undated) DEVICE — CANNULA NSL AD TBNG L7FT PVC STR NONFLARED PRNG O2 DEL W STD

## (undated) DEVICE — ENDOSCOPIC KIT 2 12 FT OP4 DE2 GWN SYR